# Patient Record
Sex: FEMALE | Race: WHITE | NOT HISPANIC OR LATINO
[De-identification: names, ages, dates, MRNs, and addresses within clinical notes are randomized per-mention and may not be internally consistent; named-entity substitution may affect disease eponyms.]

---

## 2020-04-26 ENCOUNTER — MESSAGE (OUTPATIENT)
Age: 25
End: 2020-04-26

## 2020-05-15 ENCOUNTER — APPOINTMENT (OUTPATIENT)
Dept: DISASTER EMERGENCY | Facility: CLINIC | Age: 25
End: 2020-05-15

## 2020-05-15 ENCOUNTER — TRANSCRIPTION ENCOUNTER (OUTPATIENT)
Age: 25
End: 2020-05-15

## 2020-05-16 LAB
SARS-COV-2 IGG SERPL IA-ACNC: <0.1 INDEX
SARS-COV-2 IGG SERPL QL IA: NEGATIVE

## 2020-07-20 ENCOUNTER — OUTPATIENT (OUTPATIENT)
Dept: OUTPATIENT SERVICES | Facility: HOSPITAL | Age: 25
LOS: 1 days | End: 2020-07-20
Payer: COMMERCIAL

## 2020-07-20 ENCOUNTER — APPOINTMENT (OUTPATIENT)
Dept: RADIOLOGY | Facility: CLINIC | Age: 25
End: 2020-07-20
Payer: SELF-PAY

## 2020-07-20 DIAGNOSIS — Z00.8 ENCOUNTER FOR OTHER GENERAL EXAMINATION: ICD-10-CM

## 2020-07-20 DIAGNOSIS — S99.921A UNSPECIFIED INJURY OF RIGHT FOOT, INITIAL ENCOUNTER: ICD-10-CM

## 2020-07-20 PROCEDURE — 73620 X-RAY EXAM OF FOOT: CPT | Mod: 26,RT

## 2020-07-20 PROCEDURE — 73620 X-RAY EXAM OF FOOT: CPT

## 2020-09-28 ENCOUNTER — TRANSCRIPTION ENCOUNTER (OUTPATIENT)
Age: 25
End: 2020-09-28

## 2021-01-19 ENCOUNTER — NON-APPOINTMENT (OUTPATIENT)
Age: 26
End: 2021-01-19

## 2021-01-19 ENCOUNTER — APPOINTMENT (OUTPATIENT)
Dept: OPHTHALMOLOGY | Facility: CLINIC | Age: 26
End: 2021-01-19
Payer: COMMERCIAL

## 2021-01-19 PROCEDURE — 92004 COMPRE OPH EXAM NEW PT 1/>: CPT

## 2021-01-19 PROCEDURE — 99072 ADDL SUPL MATRL&STAF TM PHE: CPT

## 2021-01-19 PROCEDURE — 92285 EXTERNAL OCULAR PHOTOGRAPHY: CPT

## 2021-01-22 ENCOUNTER — NON-APPOINTMENT (OUTPATIENT)
Age: 26
End: 2021-01-22

## 2021-01-22 ENCOUNTER — APPOINTMENT (OUTPATIENT)
Dept: OPHTHALMOLOGY | Facility: CLINIC | Age: 26
End: 2021-01-22
Payer: COMMERCIAL

## 2021-01-22 PROCEDURE — 99072 ADDL SUPL MATRL&STAF TM PHE: CPT

## 2021-01-22 PROCEDURE — 92012 INTRM OPH EXAM EST PATIENT: CPT

## 2021-01-25 ENCOUNTER — APPOINTMENT (OUTPATIENT)
Dept: OBGYN | Facility: CLINIC | Age: 26
End: 2021-01-25
Payer: COMMERCIAL

## 2021-01-25 VITALS
DIASTOLIC BLOOD PRESSURE: 60 MMHG | SYSTOLIC BLOOD PRESSURE: 100 MMHG | HEIGHT: 65 IN | WEIGHT: 138 LBS | BODY MASS INDEX: 22.99 KG/M2

## 2021-01-25 DIAGNOSIS — Z01.419 ENCOUNTER FOR GYNECOLOGICAL EXAMINATION (GENERAL) (ROUTINE) W/OUT ABNORMAL FINDINGS: ICD-10-CM

## 2021-01-25 PROCEDURE — 99385 PREV VISIT NEW AGE 18-39: CPT

## 2021-01-25 PROCEDURE — 99072 ADDL SUPL MATRL&STAF TM PHE: CPT

## 2021-01-26 LAB
C TRACH RRNA SPEC QL NAA+PROBE: NOT DETECTED
N GONORRHOEA RRNA SPEC QL NAA+PROBE: NOT DETECTED
SOURCE TP AMPLIFICATION: NORMAL

## 2021-01-28 ENCOUNTER — APPOINTMENT (OUTPATIENT)
Dept: OPHTHALMOLOGY | Facility: CLINIC | Age: 26
End: 2021-01-28
Payer: COMMERCIAL

## 2021-01-28 ENCOUNTER — NON-APPOINTMENT (OUTPATIENT)
Age: 26
End: 2021-01-28

## 2021-01-28 PROCEDURE — 99072 ADDL SUPL MATRL&STAF TM PHE: CPT

## 2021-01-28 PROCEDURE — 92012 INTRM OPH EXAM EST PATIENT: CPT

## 2021-01-30 PROBLEM — Z01.419 ENCOUNTER FOR ANNUAL ROUTINE GYNECOLOGICAL EXAMINATION: Status: RESOLVED | Noted: 2021-01-25 | Resolved: 2021-02-08

## 2021-03-25 ENCOUNTER — APPOINTMENT (OUTPATIENT)
Dept: INTERNAL MEDICINE | Facility: CLINIC | Age: 26
End: 2021-03-25
Payer: COMMERCIAL

## 2021-03-25 VITALS
BODY MASS INDEX: 24.59 KG/M2 | TEMPERATURE: 97 F | OXYGEN SATURATION: 99 % | RESPIRATION RATE: 16 BRPM | WEIGHT: 144 LBS | DIASTOLIC BLOOD PRESSURE: 70 MMHG | SYSTOLIC BLOOD PRESSURE: 110 MMHG | HEART RATE: 68 BPM | HEIGHT: 64 IN

## 2021-03-25 DIAGNOSIS — B02.30 ZOSTER OCULAR DISEASE, UNSPECIFIED: ICD-10-CM

## 2021-03-25 DIAGNOSIS — B00.52 HERPESVIRAL KERATITIS: ICD-10-CM

## 2021-03-25 DIAGNOSIS — Z82.49 FAMILY HISTORY OF ISCHEMIC HEART DISEASE AND OTHER DISEASES OF THE CIRCULATORY SYSTEM: ICD-10-CM

## 2021-03-25 DIAGNOSIS — Z83.3 FAMILY HISTORY OF DIABETES MELLITUS: ICD-10-CM

## 2021-03-25 DIAGNOSIS — Z80.1 FAMILY HISTORY OF MALIGNANT NEOPLASM OF TRACHEA, BRONCHUS AND LUNG: ICD-10-CM

## 2021-03-25 DIAGNOSIS — Z83.42 FAMILY HISTORY OF FAMILIAL HYPERCHOLESTEROLEMIA: ICD-10-CM

## 2021-03-25 PROCEDURE — 99072 ADDL SUPL MATRL&STAF TM PHE: CPT

## 2021-03-25 PROCEDURE — 99385 PREV VISIT NEW AGE 18-39: CPT

## 2021-03-25 NOTE — HISTORY OF PRESENT ILLNESS
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 She was seen by ophthalmology and started on Valtrex 500 mg t.i.d. Her symptoms have since resolved.\par She does have a history of Raynaud especially in her feet. She states symptoms worsened in the summer with discoloration and numbness sensation.\par Patient denies any cp, sob,abdominal pain, nausea, vomiting, palpitations, fever, chills, constipation, diarrhea.\par

## 2021-03-25 NOTE — ASSESSMENT
[FreeTextEntry1] : 1.health maintenance: Discussed fasting blood work to get.\par Patient counseled regarding recommendations for vaccines, seat belt safety, diet and exercise and all preventative screening.\par \par 2.herpes keratitis of right eye: Status post Valtrex, now resolved.\par \par 3.migraine headaches: Continue Excedrin as needed, keep headache journal, will call back for worsening symptoms. Next\par \par 4.Raynaud's syndrome: Continue preventative measures, does not wish to be on CCP at this time.

## 2021-04-05 LAB
ALBUMIN SERPL ELPH-MCNC: 4.4 G/DL
ALP BLD-CCNC: 51 U/L
ALT SERPL-CCNC: 13 U/L
ANION GAP SERPL CALC-SCNC: 11 MMOL/L
AST SERPL-CCNC: 13 U/L
BASOPHILS # BLD AUTO: 0.05 K/UL
BASOPHILS NFR BLD AUTO: 1 %
BILIRUB SERPL-MCNC: 0.5 MG/DL
BUN SERPL-MCNC: 11 MG/DL
CALCIUM SERPL-MCNC: 9.4 MG/DL
CHLORIDE SERPL-SCNC: 104 MMOL/L
CHOLEST SERPL-MCNC: 196 MG/DL
CO2 SERPL-SCNC: 24 MMOL/L
CREAT SERPL-MCNC: 0.8 MG/DL
EOSINOPHIL # BLD AUTO: 0.29 K/UL
EOSINOPHIL NFR BLD AUTO: 5.7 %
ESTIMATED AVERAGE GLUCOSE: 103 MG/DL
GLUCOSE SERPL-MCNC: 84 MG/DL
HBA1C MFR BLD HPLC: 5.2 %
HCT VFR BLD CALC: 43.2 %
HDLC SERPL-MCNC: 82 MG/DL
HGB BLD-MCNC: 13.8 G/DL
IMM GRANULOCYTES NFR BLD AUTO: 0.2 %
LDLC SERPL CALC-MCNC: 100 MG/DL
LYMPHOCYTES # BLD AUTO: 2.46 K/UL
LYMPHOCYTES NFR BLD AUTO: 48.3 %
MAN DIFF?: NORMAL
MCHC RBC-ENTMCNC: 28.2 PG
MCHC RBC-ENTMCNC: 31.9 GM/DL
MCV RBC AUTO: 88.3 FL
MONOCYTES # BLD AUTO: 0.41 K/UL
MONOCYTES NFR BLD AUTO: 8.1 %
NEUTROPHILS # BLD AUTO: 1.87 K/UL
NEUTROPHILS NFR BLD AUTO: 36.7 %
NONHDLC SERPL-MCNC: 115 MG/DL
PLATELET # BLD AUTO: 183 K/UL
POTASSIUM SERPL-SCNC: 4 MMOL/L
PROT SERPL-MCNC: 7.5 G/DL
RBC # BLD: 4.89 M/UL
RBC # FLD: 13 %
SODIUM SERPL-SCNC: 140 MMOL/L
TRIGL SERPL-MCNC: 73 MG/DL
TSH SERPL-ACNC: 1.69 UIU/ML
WBC # FLD AUTO: 5.09 K/UL

## 2021-08-03 ENCOUNTER — APPOINTMENT (OUTPATIENT)
Dept: INTERNAL MEDICINE | Facility: CLINIC | Age: 26
End: 2021-08-03
Payer: COMMERCIAL

## 2021-08-03 ENCOUNTER — NON-APPOINTMENT (OUTPATIENT)
Age: 26
End: 2021-08-03

## 2021-08-03 VITALS
OXYGEN SATURATION: 97 % | HEIGHT: 64 IN | WEIGHT: 139 LBS | BODY MASS INDEX: 23.73 KG/M2 | SYSTOLIC BLOOD PRESSURE: 110 MMHG | DIASTOLIC BLOOD PRESSURE: 86 MMHG | TEMPERATURE: 98.1 F | HEART RATE: 60 BPM | RESPIRATION RATE: 16 BRPM

## 2021-08-03 DIAGNOSIS — T14.8XXA OTHER INJURY OF UNSPECIFIED BODY REGION, INITIAL ENCOUNTER: ICD-10-CM

## 2021-08-03 DIAGNOSIS — M79.661 PAIN IN RIGHT LOWER LEG: ICD-10-CM

## 2021-08-03 DIAGNOSIS — R42 DIZZINESS AND GIDDINESS: ICD-10-CM

## 2021-08-03 DIAGNOSIS — R26.89 OTHER ABNORMALITIES OF GAIT AND MOBILITY: ICD-10-CM

## 2021-08-03 PROCEDURE — 99214 OFFICE O/P EST MOD 30 MIN: CPT | Mod: 25

## 2021-08-03 PROCEDURE — 93000 ELECTROCARDIOGRAM COMPLETE: CPT

## 2021-08-03 PROCEDURE — 36415 COLL VENOUS BLD VENIPUNCTURE: CPT

## 2021-08-03 NOTE — PHYSICAL EXAM
[No Acute Distress] : no acute distress [Well-Appearing] : well-appearing [Normal Sclera/Conjunctiva] : normal sclera/conjunctiva [PERRL] : pupils equal round and reactive to light [EOMI] : extraocular movements intact [Normal Oropharynx] : the oropharynx was normal [No Lymphadenopathy] : no lymphadenopathy [Supple] : supple [Thyroid Normal, No Nodules] : the thyroid was normal and there were no nodules present [No Respiratory Distress] : no respiratory distress  [Clear to Auscultation] : lungs were clear to auscultation bilaterally [Normal Rate] : normal rate  [Regular Rhythm] : with a regular rhythm [Normal S1, S2] : normal S1 and S2 [No Murmur] : no murmur heard [Pedal Pulses Present] : the pedal pulses are present [No Edema] : there was no peripheral edema [Soft] : abdomen soft [Non Tender] : non-tender [No HSM] : no HSM [Normal Supraclavicular Nodes] : no supraclavicular lymphadenopathy [Normal Posterior Cervical Nodes] : no posterior cervical lymphadenopathy [Normal Anterior Cervical Nodes] : no anterior cervical lymphadenopathy [No CVA Tenderness] : no CVA  tenderness [No Spinal Tenderness] : no spinal tenderness [No Joint Swelling] : no joint swelling [No Focal Deficits] : no focal deficits [Normal Gait] : normal gait [Deep Tendon Reflexes (DTR)] : deep tendon reflexes were 2+ and symmetric [Normal Affect] : the affect was normal [Alert and Oriented x3] : oriented to person, place, and time [de-identified] : No photophobia [de-identified] : No chest tenderness [de-identified] : Right calf: Faint bruising posteriorly, minimal diffuse tenderness; no palpable cord, negative Homans' sign [de-identified] : Negative Romberg's

## 2021-08-03 NOTE — HISTORY OF PRESENT ILLNESS
[FreeTextEntry8] : \par 24 yo F pmhx migraine headaches, Raynaud's for acute visit\par \par Last seen by PMD, Dr. Parson 3/25/21 for CPE with labs done.\par \par \par States had onset of lightheadedness few days ago, felt like would pass out - onset while sitting in car was passenger- had been driving x 5mins.  Notes same sx's as gets when has migraine HA.\par -had min usual HA (posterior throbbing), became clammy in hands, mild lightheadedness feeling- opened window and it resolved after 10 mins.  No LOC, vision trouble, dysarthria, focal weakness, incontinence or confusion.  Had last eaten 4 hrs prior.  Otherwise was feeling well.\par -denies hx hypoglycemic sx's if misses meals \par -denies recent illness\par -no sx recurrence since, notes hx similar x4 in past 2 mo.  Notes onset x 2 yrs, getting more frequent in past year, gets 1x/wk.  No known triggers. \par -denies hx neurology eval or brain imaging\par \par Usual HA a/w nausea, lightheaded- usually takes Excedrin and goes to sleep.  Recent sx's same, but was no abl to sleep and let it pass.\par hx imbalance on/off x 4-5 yrs, no clear relation to HA sx's, no falls\par \par c/o chest pain- either right sided or under left breast on/off x 2mo, no relation to activity\par -feels sharp, lasts 5 mins and self resolves\par -not a/w dizziness, palpitations or sob.  No relation to mood- denies anxiety.\par \par c/o easy bruising- noticed bruise on right calf 3d ago, noted on awakening due to pain felt.  Denies known trauma.\par -mainly tender on touching area, getting less.  No leg swelling or claudication noted.\par \par States noted some upper leg (right) bruising also recently- noted by mom/BF, denies known trauma, since resolved\par -occasionally gets gum bleeding, told has gingivitis- plans to f/u with dentist soon\par -denies any other clinical bleeding or FH abnl bleeding or blood clotting\par -on Nuvaring x 5 yrs\par -on Excedrin prn- last taken 4d prior to bruising onset.\par -No NSAIDs used.  No recent ETOH intake.\par \par Reports is socially distancing and using precautions for covid prevention.\par Denies sick or covid positive contacts.\par Denies fever, chills, cough or sob.\par -hx pfizer- last dose 2/21\par

## 2021-08-03 NOTE — REVIEW OF SYSTEMS
[Negative] : Psychiatric [FreeTextEntry5] : see HPI [de-identified] : see HPI [de-identified] : see HPI

## 2021-08-03 NOTE — ASSESSMENT
[FreeTextEntry1] : \par \par 24 yo F pmhx migraine headaches, Raynaud's for acute visit\par \par Migraine headaches, hx imbalance- stable migraine sx's reported, but increasing frequency; nonfocal neuro exam\par -Check MRI brain\par -Neuro referral for evaluation\par -Check labs: CBC/CMP/TSH/B12/folate\par -Advised prompt medical eval if symptoms recur or worsen or new symptoms arise\par -advised to keep HA diary to ID triggers\par \par Atypical chest pain, lightheadedness -vital signs stable, asx currently\par -EKG sinus bradycardia at 57, normal axis, no LVH/Path Q/ST changes (no prior)\par -Cardio referral for eval\par -Advised increased hydration, eat regularly with snacks\par -Check labs: CBC/CMP/TSH/B12/folate\par -Advised prompt medical eval if symptoms recur or worsen or new symptoms arise\par \par Bruising-no known history of trauma, on Excedrin as needed which may be contributory\par -Check CBC/PT/INR\par -Check RLE duplex\par -Consider heme eval if labs abnormal\par -Advised prompt medical eval if symptoms recur or worsen or new symptoms arise\par \par \par MISC:  Continued social distancing and measure for covid19 prevention encouraged.  \par -hx pfizer- last dose 2/21\par \par \par HCM\par -hx CPE 3/21 with PMD, Dr. Parson\par \par \par Pt's cell: [phone]\par \par Advised f/u in 1mo with PMD for cont'd care.  Will relay above to PMD.\par \par \par Labs drawn in office today.\par

## 2021-08-04 ENCOUNTER — NON-APPOINTMENT (OUTPATIENT)
Age: 26
End: 2021-08-04

## 2021-08-04 LAB
ANION GAP SERPL CALC-SCNC: 12 MMOL/L
BASOPHILS # BLD AUTO: 0.04 K/UL
BASOPHILS NFR BLD AUTO: 0.6 %
BUN SERPL-MCNC: 14 MG/DL
CALCIUM SERPL-MCNC: 9.4 MG/DL
CHLORIDE SERPL-SCNC: 106 MMOL/L
CO2 SERPL-SCNC: 23 MMOL/L
CREAT SERPL-MCNC: 0.68 MG/DL
EOSINOPHIL # BLD AUTO: 0.09 K/UL
EOSINOPHIL NFR BLD AUTO: 1.4 %
FOLATE SERPL-MCNC: 10.4 NG/ML
GLUCOSE SERPL-MCNC: 91 MG/DL
HCT VFR BLD CALC: 41.8 %
HGB BLD-MCNC: 13.3 G/DL
IMM GRANULOCYTES NFR BLD AUTO: 0.2 %
INR PPP: 0.94 RATIO
LYMPHOCYTES # BLD AUTO: 2.21 K/UL
LYMPHOCYTES NFR BLD AUTO: 35.6 %
MAN DIFF?: NORMAL
MCHC RBC-ENTMCNC: 27.7 PG
MCHC RBC-ENTMCNC: 31.8 GM/DL
MCV RBC AUTO: 87.1 FL
MONOCYTES # BLD AUTO: 0.52 K/UL
MONOCYTES NFR BLD AUTO: 8.4 %
NEUTROPHILS # BLD AUTO: 3.34 K/UL
NEUTROPHILS NFR BLD AUTO: 53.8 %
PLATELET # BLD AUTO: 216 K/UL
POTASSIUM SERPL-SCNC: 4.7 MMOL/L
PT BLD: 11.1 SEC
RBC # BLD: 4.8 M/UL
RBC # FLD: 13.1 %
SODIUM SERPL-SCNC: 142 MMOL/L
TSH SERPL-ACNC: 1.47 UIU/ML
VIT B12 SERPL-MCNC: 295 PG/ML
WBC # FLD AUTO: 6.21 K/UL

## 2021-08-20 ENCOUNTER — APPOINTMENT (OUTPATIENT)
Dept: OBGYN | Facility: CLINIC | Age: 26
End: 2021-08-20
Payer: COMMERCIAL

## 2021-08-20 VITALS
HEIGHT: 64 IN | WEIGHT: 138 LBS | DIASTOLIC BLOOD PRESSURE: 70 MMHG | BODY MASS INDEX: 23.56 KG/M2 | SYSTOLIC BLOOD PRESSURE: 112 MMHG

## 2021-08-20 PROCEDURE — 99212 OFFICE O/P EST SF 10 MIN: CPT

## 2021-09-07 ENCOUNTER — APPOINTMENT (OUTPATIENT)
Dept: INTERNAL MEDICINE | Facility: CLINIC | Age: 26
End: 2021-09-07

## 2021-09-30 ENCOUNTER — APPOINTMENT (OUTPATIENT)
Dept: CARDIOLOGY | Facility: CLINIC | Age: 26
End: 2021-09-30

## 2021-10-08 ENCOUNTER — NON-APPOINTMENT (OUTPATIENT)
Age: 26
End: 2021-10-08

## 2021-10-08 ENCOUNTER — APPOINTMENT (OUTPATIENT)
Dept: NEUROLOGY | Facility: CLINIC | Age: 26
End: 2021-10-08
Payer: COMMERCIAL

## 2021-10-08 VITALS
HEART RATE: 66 BPM | WEIGHT: 140 LBS | SYSTOLIC BLOOD PRESSURE: 125 MMHG | BODY MASS INDEX: 23.32 KG/M2 | HEIGHT: 65 IN | TEMPERATURE: 97.8 F | DIASTOLIC BLOOD PRESSURE: 84 MMHG

## 2021-10-08 PROCEDURE — 99205 OFFICE O/P NEW HI 60 MIN: CPT

## 2021-10-08 NOTE — HISTORY OF PRESENT ILLNESS
[FreeTextEntry1] : Ms. Noble is here today for neurology evaluation.\par She reports a history of migraines for ~ 2 years.\par Over the last 6-7 months she has been having migraines 1-2 times per week.\par \par Headaches are preceded by blurring of her vision and feeling clammy. Sometimes this is followed by feeling that her heart is racing.\par Headaches are usually focused over the temples and sometimes the base of the head/neck.\par She has associated nausea and phonophobia.\par Sometimes she has paresthesias in her hands.\par She is not aware of any specific triggers or correlation with her menstrual cycle.\par \par She typically takes Excedrin which usually helps.\par She denies family history of migraines.\par \par Over the last 2-3 months she feels forgetful.\par \par Sleep is sometimes fragmented but she does get adequate sleep duration and is not aware of snoring.\par \par

## 2021-10-08 NOTE — DISCUSSION/SUMMARY
[FreeTextEntry1] : Ms. Noble is a 26 year old woman who presents today for evaluation of headaches.\par Headaches are most consistent with migraines.\par There may also be some vasovagal component as she feels clammy and sometimes has palpitations.\par She has a normal neurological examination.\par \par Headaches:\par -Most likely migraines with possible aura.\par -MRI brain has been ordered by primary care physician and is pending.\par -We discussed both preventative and abortive treatment options. She would like to avoid daily medications  but since her headache frequency is somewhat high, I suggest a trial of magnesium 400-500 mg/day or Migrelief (magnesium + riboflavin + feverfew). I did advise her that magnesium may cause diarrhea.\par -Trial of rizatriptan when Excedrin is not effective. Can take 10 mg and repeat x 1 if needed with max of 20 mg/day. Potential side effects discussed. I am also giving her samples of Nurtec ODT 75 mg. She can \par take 1 as needed for a migraine.\par -Keep headache log.\par -Discussed common triggers.\par -Discussed that the combination of migraine with aura and estrogen containing OCPs increases risk for stroke. It is unclear if this risk is the same with Nuva ring. She does not smoke or have a history of blood clots. She can discuss this with gynecology and consider alternative methods of contraception such as an IUD.\par \par Memory concerns\par -MRI brain as above.\par -Vitamin B12 level on low side. Suggest taking vitamin B12 1000 mcg/day.\par \par f/u ~ 3 months, sooner if needed.

## 2021-12-02 ENCOUNTER — TRANSCRIPTION ENCOUNTER (OUTPATIENT)
Age: 26
End: 2021-12-02

## 2021-12-02 ENCOUNTER — APPOINTMENT (OUTPATIENT)
Dept: INTERNAL MEDICINE | Facility: CLINIC | Age: 26
End: 2021-12-02
Payer: COMMERCIAL

## 2021-12-02 DIAGNOSIS — J34.89 OTHER SPECIFIED DISORDERS OF NOSE AND NASAL SINUSES: ICD-10-CM

## 2021-12-02 DIAGNOSIS — R22.0 LOCALIZED SWELLING, MASS AND LUMP, HEAD: ICD-10-CM

## 2021-12-02 PROCEDURE — 99442: CPT

## 2021-12-02 NOTE — ASSESSMENT
[FreeTextEntry1] : 1.rhinorrhea/headache/congestion: pt with negative rapid covid 19 test on 11/30 with covid PCR swab from 12/1 still pending via S. Advised to quarantine until her PCR results return if she had exposure to covid 19 on 11/26 with her boyfriend. Discussed taking Tylenol for headache and fever. Discussed over-the-counter medications for congestion. Advised prompt evaluation at urgent care for her lip swelling.\par All questions answered.\par Discussed signs and symptoms to warrant urgent evaluation with patient.\par

## 2021-12-02 NOTE — HISTORY OF PRESENT ILLNESS
[Home] : at home, [unfilled] , at the time of the visit. [Medical Office: ([facility])___] : at the medical office located in  [Verbal consent obtained from patient] : the patient, [unfilled] [FreeTextEntry8] : Ms. KATIA OLIVEIRA is a 26 year F who calls in for an acute visit.\par Pt states she last saw her boyfriend on 11/25/21 on thanksgiving, and he started with symptoms of cough/congestion on Friday night 11/26 and he tested positive for covid on 11/30/21. She was with about 6 other people on thanksgiving with her boyfriend and they have no symptoms. \par Pt started with symptoms on Sunday with rhinorrhea with congestion on Monday, clear discharge. No fever/chills or anosmia. She does have mild headache as well on Sunday. She has mild chest tightness due to congestion with dry cough. \par She notes her mom has breast ca and didn't want to expose her and she had rapid covid test at [facility] Tues. night and was negative. \par She called [facility] and had PCR covid testing yest morning and has been out of work since then. She notes upper and lower lip swelling that  occurred this morning. She last took Advil cold/sinus yest and has no reactions in the past. She would like a medrol dose dawn. \par Patient denies any chest pain, shortness of breath, abdominal pain, nausea, vomiting, palpitations, constipation, diarrhea, loss of sense of taste/smell.\par

## 2021-12-07 LAB — SARS-COV-2 N GENE NPH QL NAA+PROBE: NOT DETECTED

## 2021-12-08 ENCOUNTER — MED ADMIN CHARGE (OUTPATIENT)
Age: 26
End: 2021-12-08

## 2021-12-09 ENCOUNTER — APPOINTMENT (OUTPATIENT)
Dept: INTERNAL MEDICINE | Facility: CLINIC | Age: 26
End: 2021-12-09
Payer: COMMERCIAL

## 2021-12-09 VITALS
TEMPERATURE: 97.8 F | DIASTOLIC BLOOD PRESSURE: 70 MMHG | HEIGHT: 65 IN | HEART RATE: 75 BPM | BODY MASS INDEX: 24.16 KG/M2 | SYSTOLIC BLOOD PRESSURE: 114 MMHG | WEIGHT: 145 LBS | OXYGEN SATURATION: 99 %

## 2021-12-09 DIAGNOSIS — J06.9 ACUTE UPPER RESPIRATORY INFECTION, UNSPECIFIED: ICD-10-CM

## 2021-12-09 PROCEDURE — 99213 OFFICE O/P EST LOW 20 MIN: CPT

## 2021-12-10 NOTE — HISTORY OF PRESENT ILLNESS
[FreeTextEntry8] : Ms. KATIA OLIVEIRA is a 26 year F who comes in for an acute visit.\par Pt started with cough/congestion since 11/28/21 and tested negative for covid x 2 PCR and 2 rapid tests. Her cough continues and she feels chest tightness as well.\par She has high anxiety due to her moms breast ca.\par Patient denies any cp, sob,abdominal pain, nausea, vomiting, palpitations, fever, chills, constipation, diarrhea.\par \par

## 2021-12-10 NOTE — ASSESSMENT
[FreeTextEntry1] : 1.URI: negative covid pcr x 2 rapid covid negative x 2. Start zpak and medrol dose dawn. Went over instructions and side effects of medication.\par Increase fluids, rest. \par Take Tylenol 500 mg 1-2 tabs as needed every 8 hours for pain. \par Call for new or worsening symptoms.\par

## 2021-12-28 ENCOUNTER — APPOINTMENT (OUTPATIENT)
Dept: INTERNAL MEDICINE | Facility: CLINIC | Age: 26
End: 2021-12-28
Payer: COMMERCIAL

## 2021-12-28 DIAGNOSIS — R05.9 COUGH, UNSPECIFIED: ICD-10-CM

## 2021-12-28 PROCEDURE — 99441: CPT

## 2021-12-28 NOTE — ASSESSMENT
[FreeTextEntry1] : 1.Cough: previously had negative covid tests (see previous note).\par obtain CXR to r/o infectious process. Start on Prednisone extended taper and codeine/guaifenesin prn for cough relief at bedtime. Went over instructions and side effects of medication.\par If no improvement will need to start on doxycycline as well. \par All questions answered.\par Call for new or worsening symptoms.\par

## 2021-12-28 NOTE — HISTORY OF PRESENT ILLNESS
[Home] : at home, [unfilled] , at the time of the visit. [Other Location: e.g. Home (Enter Location, City,State)___] : at [unfilled] [Verbal consent obtained from patient] : the patient, [unfilled] [FreeTextEntry8] : KATIA OLIVEIRA is a 26 year F who calls in for a follow up visit.\par Pt with recent URI/bronchitis and continues to have cough, dry with coughing fits. No Sob or chest pain. No fever/chills. Other symptoms have resolved. \par Patient denies any cp, sob,abdominal pain, nausea, vomiting, palpitations, constipation, diarrhea.\par

## 2021-12-31 ENCOUNTER — APPOINTMENT (OUTPATIENT)
Dept: MRI IMAGING | Facility: CLINIC | Age: 26
End: 2021-12-31
Payer: COMMERCIAL

## 2021-12-31 ENCOUNTER — RESULT REVIEW (OUTPATIENT)
Age: 26
End: 2021-12-31

## 2021-12-31 ENCOUNTER — TRANSCRIPTION ENCOUNTER (OUTPATIENT)
Age: 26
End: 2021-12-31

## 2021-12-31 ENCOUNTER — APPOINTMENT (OUTPATIENT)
Dept: RADIOLOGY | Facility: CLINIC | Age: 26
End: 2021-12-31

## 2021-12-31 ENCOUNTER — OUTPATIENT (OUTPATIENT)
Dept: OUTPATIENT SERVICES | Facility: HOSPITAL | Age: 26
LOS: 1 days | End: 2021-12-31
Payer: COMMERCIAL

## 2021-12-31 DIAGNOSIS — R05.9 COUGH, UNSPECIFIED: ICD-10-CM

## 2021-12-31 PROCEDURE — A9585: CPT

## 2021-12-31 PROCEDURE — 71046 X-RAY EXAM CHEST 2 VIEWS: CPT | Mod: 26

## 2021-12-31 PROCEDURE — 71046 X-RAY EXAM CHEST 2 VIEWS: CPT

## 2021-12-31 PROCEDURE — 70553 MRI BRAIN STEM W/O & W/DYE: CPT

## 2021-12-31 PROCEDURE — 70553 MRI BRAIN STEM W/O & W/DYE: CPT | Mod: 26

## 2022-01-10 ENCOUNTER — NON-APPOINTMENT (OUTPATIENT)
Age: 27
End: 2022-01-10

## 2022-01-11 ENCOUNTER — APPOINTMENT (OUTPATIENT)
Dept: NEUROLOGY | Facility: CLINIC | Age: 27
End: 2022-01-11
Payer: COMMERCIAL

## 2022-01-11 DIAGNOSIS — G43.909 MIGRAINE, UNSPECIFIED, NOT INTRACTABLE, W/OUT STATUS MIGRAINOSUS: ICD-10-CM

## 2022-01-11 PROCEDURE — 99213 OFFICE O/P EST LOW 20 MIN: CPT | Mod: 95

## 2022-01-11 NOTE — HISTORY OF PRESENT ILLNESS
[Other Location: e.g. School (Enter Location, City,State)___] : at [unfilled], at the time of the visit. [Verbal consent obtained from patient] : the patient, [unfilled] [FreeTextEntry1] : Initial visit 10/8/21:\par Ms. Noble is here today for neurology evaluation.\par She reports a history of migraines for ~ 2 years.\par Over the last 6-7 months she has been having migraines 1-2 times per week.\par \par Headaches are preceded by blurring of her vision and feeling clammy. Sometimes this is followed by feeling that her heart is racing.\par Headaches are usually focused over the temples and sometimes the base of the head/neck.\par She has associated nausea and phonophobia.\par Sometimes she has paresthesias in her hands.\par She is not aware of any specific triggers or correlation with her menstrual cycle.\par \par She typically takes Excedrin which usually helps.\par She denies family history of migraines.\par \par Over the last 2-3 months she feels forgetful.\par \par Sleep is sometimes fragmented but she does get adequate sleep duration and is not aware of snoring.\par \par Interval HIstory\par 1/11/22:\par She continues to have migraines. \par She has about one bad headache per week.\par Her last migraine lasted for three days and was not responsive to Excedrin, Aleve. \par She did not try magnesium or Migrelief. She states that she has been focusing on her mother's recent health problems.\par Stress seems to trigger migraines.\par She found that Nurtec was helpful in aborting migraines. \par She did not yet  the rizatriptan (pharmacy stated that they did not have the prescription).\par \par She has not started taking vitamin B12.\par \par She does not have plans of conceiving at the current time.

## 2022-01-11 NOTE — DATA REVIEWED
[de-identified] : MRI head with and without contrast 12/31/21:\par Unremarkable MRI of the brain with and without contrast. Mild paranasal sinus mucosal thickening

## 2022-01-11 NOTE — DISCUSSION/SUMMARY
[FreeTextEntry1] : Ms. Noble is a 26 year old woman who presents today for evaluation of headaches.\par Headaches are most consistent with migraines.\par There may also be some vasovagal component as she feels clammy and sometimes has palpitations.\par She has a normal neurological examination.\par \par Headaches:\par -Most likely migraines with possible aura.\par -MRI brain showed some sinus inflammation (she was treated with antibiotics and prednisone). but was otherwise unremarkable.\par -Headahce frequency is currently about once per week.\par -Trial of magnesium or migrelief to try to decrease frequency rather than starting a daily preventative medication.\par -Trial of rizatriptan MLT 10 mg to abort migraines. If not effective, will send prescription for Nurtec which she found to be effective (insurance usually requires failure of a triptan before approving Nurtec).\par \par Memory concerns\par -MRI brain unremarkable.\par -Vitamin B12 level on low side. Suggest taking vitamin B12 1000 mcg/day.\par \par f/u ~ 4-6 months, sooner if needed.

## 2022-01-13 ENCOUNTER — TRANSCRIPTION ENCOUNTER (OUTPATIENT)
Age: 27
End: 2022-01-13

## 2022-01-19 ENCOUNTER — EMERGENCY (EMERGENCY)
Facility: HOSPITAL | Age: 27
LOS: 0 days | Discharge: HOME | End: 2022-01-20
Attending: EMERGENCY MEDICINE
Payer: COMMERCIAL

## 2022-01-19 ENCOUNTER — APPOINTMENT (OUTPATIENT)
Dept: AFTER HOURS CARE | Facility: EMERGENCY ROOM | Age: 27
End: 2022-01-19
Payer: COMMERCIAL

## 2022-01-19 VITALS
DIASTOLIC BLOOD PRESSURE: 58 MMHG | HEIGHT: 63 IN | RESPIRATION RATE: 18 BRPM | OXYGEN SATURATION: 97 % | SYSTOLIC BLOOD PRESSURE: 118 MMHG | WEIGHT: 138.01 LBS | HEART RATE: 96 BPM | TEMPERATURE: 98 F

## 2022-01-19 DIAGNOSIS — R07.9 CHEST PAIN, UNSPECIFIED: ICD-10-CM

## 2022-01-19 PROCEDURE — 84484 ASSAY OF TROPONIN QUANT: CPT

## 2022-01-19 PROCEDURE — 84702 CHORIONIC GONADOTROPIN TEST: CPT

## 2022-01-19 PROCEDURE — 99285 EMERGENCY DEPT VISIT HI MDM: CPT

## 2022-01-19 PROCEDURE — 99283 EMERGENCY DEPT VISIT LOW MDM: CPT | Mod: 25

## 2022-01-19 PROCEDURE — 93005 ELECTROCARDIOGRAM TRACING: CPT

## 2022-01-19 PROCEDURE — 85025 COMPLETE CBC W/AUTO DIFF WBC: CPT

## 2022-01-19 PROCEDURE — 36415 COLL VENOUS BLD VENIPUNCTURE: CPT

## 2022-01-19 PROCEDURE — 80053 COMPREHEN METABOLIC PANEL: CPT

## 2022-01-19 PROCEDURE — 99202 OFFICE O/P NEW SF 15 MIN: CPT | Mod: NC,95

## 2022-01-19 PROCEDURE — 93010 ELECTROCARDIOGRAM REPORT: CPT

## 2022-01-19 NOTE — ED ADULT TRIAGE NOTE - CHIEF COMPLAINT QUOTE
sent by telehealth MD with complaints of chest pain all day after receiving third pfizer covid19 vaccine. chest pain worse when bending over.

## 2022-01-20 VITALS
HEART RATE: 87 BPM | DIASTOLIC BLOOD PRESSURE: 66 MMHG | TEMPERATURE: 98 F | SYSTOLIC BLOOD PRESSURE: 114 MMHG | OXYGEN SATURATION: 98 % | RESPIRATION RATE: 17 BRPM

## 2022-01-20 RX ORDER — KETOROLAC TROMETHAMINE 30 MG/ML
30 SYRINGE (ML) INJECTION ONCE
Refills: 0 | Status: DISCONTINUED | OUTPATIENT
Start: 2022-01-20 | End: 2022-01-20

## 2022-01-20 NOTE — ED PROVIDER NOTE - OBJECTIVE STATEMENT
Pt. is a 27 yo F on Nuvaring for birth control presenting with chest pain.  Pain is substernal and worse with movement, especially leaning forward.  Patient had Covid booster shot today and states chest pain started several hours after the shot. Patient states she had same symptoms with the other Covid shots.  She did a telemedicine consult prior to arrival and was sent to the ER for EKG.  Patient had fever prior to arrival but took tylenol.  Denies congestion, coughing, vomiting or diarrhea. Pt. is a 27 yo F on Nuvaring for birth control, hx of Raynauds syndrome is presenting with chest pain.  Pain is substernal and worse with movement, especially leaning forward.  Patient had Covid booster shot today and states chest pain started several hours after the shot. Patient states she had same symptoms with the other Covid shots.  She did a telemedicine consult prior to arrival and was sent to the ER for EKG.  Patient had fever prior to arrival but took tylenol.  Denies congestion, coughing, vomiting or diarrhea.

## 2022-01-20 NOTE — ED PROVIDER NOTE - CLINICAL SUMMARY MEDICAL DECISION MAKING FREE TEXT BOX
Possible vaccine side effect; but EKG labs normal.  NSAIDs or tylenol recommended with PMD follow up.

## 2022-01-20 NOTE — ED PROVIDER NOTE - PATIENT PORTAL LINK FT
You can access the FollowMyHealth Patient Portal offered by [facility] by registering at the following website: [email or web address]. By joining [employer]’s FollowMyHealth portal, you will also be able to view your health information using other applications (apps) compatible with our system.

## 2022-01-20 NOTE — ED ADULT NURSE NOTE - OBJECTIVE STATEMENT
pt presents to the ED c/o chest pain that started today worse with movement. Pt states she got her covid booster today and the same thing happened last time. She had a tele health call and they told her to go to the ER. Pt is awake and alert, following commands appropriately with equal unlabored respirations. CCM in place. Safety maintained.

## 2022-01-20 NOTE — ED PROVIDER NOTE - CARDIAC, MLM
Normal rate, regular rhythm.  Heart sounds S1, S2.  No murmurs, rubs or gallops. +reproducible tenderness along sternum

## 2022-01-20 NOTE — ED PROVIDER NOTE - PROGRESS NOTE DETAILS
Patient refused covid testing, Chest xray and toradol in ED.  Last CXR on 12/31/21 within normal limits.

## 2022-01-20 NOTE — ED PROVIDER NOTE - NSFOLLOWUPINSTRUCTIONS_ED_ALL_ED_FT
Take tylenol or ibuprofen for pain as needed.        Follow up with your doctor within 1 week.               CHEST WALL PAIN - AfterCare(R) Instructions(ER/ED)           Chest Wall Pain    WHAT YOU NEED TO KNOW:    Chest wall pain may be caused by problems with the muscles, cartilage, or bones of the chest wall. Chest wall pain may also be caused by pain that spreads to your chest from another part of your body. The pain may be aching, severe, dull, or sharp. It may come and go, or it may be constant. The pain may be worse when you move in certain ways, breathe deeply, or cough.     DISCHARGE INSTRUCTIONS:    Call 911 if:   •You have any of the following signs of a heart attack: ?Squeezing, pressure, or pain in your chest      ?You may also have any of the following: ?Discomfort or pain in your back, neck, jaw, stomach, or arm      ?Shortness of breath      ?Nausea or vomiting      ?Lightheadedness or a sudden cold sweat            Return to the emergency department if:   •You have severe pain.          Contact your healthcare provider if:   •You develop a rash.       •You have other new symptoms.      •Your pain does not improve, even with treatment.      •You have questions or concerns about your condition or care.       Medicines: You may need any of the following:   •NSAIDs, such as ibuprofen, help decrease swelling, pain, and fever. This medicine is available with or without a doctor's order. NSAIDs can cause stomach bleeding or kidney problems in certain people. If you take blood thinner medicine, always ask your healthcare provider if NSAIDs are safe for you. Always read the medicine label and follow directions.      •Acetaminophen decreases pain. It is available without a doctor's order. Ask how much to take and how often to take it. Follow directions. Acetaminophen can cause liver damage if not taken correctly.      •A cream may be applied to your chest to decrease pain.       •Take your medicine as directed. Contact your healthcare provider if you think your medicine is not helping or if you have side effects. Tell him of her if you are allergic to any medicine. Keep a list of the medicines, vitamins, and herbs you take. Include the amounts, and when and why you take them. Bring the list or the pill bottles to follow-up visits. Carry your medicine list with you in case of an emergency.      Follow up with your healthcare provider as directed: Write down your questions so you remember to ask them during your visits.     Self-care:   •Rest as needed. Avoid activities that make your chest wall pain worse.      •Apply heat on your chest for 20 to 30 minutes every 2 hours for as many days as directed. Heat helps decrease pain and muscle spasms.      •Apply ice on your chest for 15 to 20 minutes every hour or as directed. Use an ice pack, or put crushed ice in a plastic bag. Cover it with a towel. Ice helps prevent tissue damage and decreases swelling and pain.         © Copyright [employer] 2022           back to top                          © Copyright [employer] 2022

## 2022-02-02 NOTE — PHYSICAL EXAM
[de-identified] : [age]F\par General: pt appears stated age, and is not in distress, speaking in full clear sentences\par HEENT: AT/NC, pink conjunctiva, anicteric sclerae, EOMI, dry mm\par Neck: supple, full ROM, trachea midline\par Lungs: symmetric excursion, no respiratory distress, no tachypnea\par Extremities: no clubbing, cyanosis, or edema visualized and no obvious deformities or fractures\par Skin: no rashes, petechiae, ecchymoses, or jaundice\par Neuro: awake, alert, responsive; cranial nerves grossly intact, EOMI intact jaw movement, no facial asymmetry, hearing intact\par

## 2022-02-02 NOTE — PLAN
[By Private Vehicle] : Sent to Emergency Department by private vehicle [FreeTextEntry1] : 1)	Pt instructed to Take 2 tylenol 500mg now for her fever\par 2)	Pt advised to go to the nearest hospital for further management. She reports that someone can drive her and she doesn’t need EMS transport. [facility] is closest to her. \par

## 2022-02-02 NOTE — ASSESSMENT
[FreeTextEntry1] : Pt w/ aforementioned presentation concerning for but not limited to drug reaction, pericarditis, myocarditis, msk pain. Pt requires at the very least an EKG.

## 2022-02-02 NOTE — HISTORY OF PRESENT ILLNESS
[Home] : at home, [unfilled] , at the time of the visit. [Other Location: e.g. Home (Enter Location, City,State)___] : at [unfilled] [Verbal consent obtained from patient] : the patient, [unfilled] [FreeTextEntry8] : 25 Yo f hx of migraines and reynaud’s c/o CP since this morning. Pt reports receiving her Pfizer booster at 7:30am and immediately after developed numbness/paresthesias in her hand and heart racing. Since then she has had chest pain throughout the day described as substernal, non-radiating, pressure-like, worse w/ lying back or leaning forward and severe and constant since around 6pm. She also presently has a fever now w/ nausea and dizziness. She denies any vomiting, abd pain, dyspnea. She reports when she received her 2nd dose of vaccine she felt similarly wiped out w/ burning in her chest but this is different and much more severe and constant.

## 2022-02-23 ENCOUNTER — NON-APPOINTMENT (OUTPATIENT)
Age: 27
End: 2022-02-23

## 2022-03-15 ENCOUNTER — NON-APPOINTMENT (OUTPATIENT)
Age: 27
End: 2022-03-15

## 2022-03-17 ENCOUNTER — NON-APPOINTMENT (OUTPATIENT)
Age: 27
End: 2022-03-17

## 2022-03-21 ENCOUNTER — APPOINTMENT (OUTPATIENT)
Dept: OBGYN | Facility: CLINIC | Age: 27
End: 2022-03-21
Payer: COMMERCIAL

## 2022-03-21 VITALS — SYSTOLIC BLOOD PRESSURE: 114 MMHG | BODY MASS INDEX: 23.13 KG/M2 | WEIGHT: 139 LBS | DIASTOLIC BLOOD PRESSURE: 78 MMHG

## 2022-03-21 PROCEDURE — 99212 OFFICE O/P EST SF 10 MIN: CPT

## 2022-03-22 LAB
C TRACH RRNA SPEC QL NAA+PROBE: NOT DETECTED
N GONORRHOEA RRNA SPEC QL NAA+PROBE: NOT DETECTED
SOURCE AMPLIFICATION: NORMAL

## 2022-04-04 ENCOUNTER — NON-APPOINTMENT (OUTPATIENT)
Age: 27
End: 2022-04-04

## 2022-04-05 ENCOUNTER — APPOINTMENT (OUTPATIENT)
Dept: DERMATOLOGY | Facility: CLINIC | Age: 27
End: 2022-04-05
Payer: COMMERCIAL

## 2022-04-05 ENCOUNTER — NON-APPOINTMENT (OUTPATIENT)
Age: 27
End: 2022-04-05

## 2022-04-05 DIAGNOSIS — D18.01 HEMANGIOMA OF SKIN AND SUBCUTANEOUS TISSUE: ICD-10-CM

## 2022-04-05 DIAGNOSIS — D48.5 NEOPLASM OF UNCERTAIN BEHAVIOR OF SKIN: ICD-10-CM

## 2022-04-05 DIAGNOSIS — L81.4 OTHER MELANIN HYPERPIGMENTATION: ICD-10-CM

## 2022-04-05 DIAGNOSIS — D22.30 MELANOCYTIC NEVI OF UNSPECIFIED PART OF FACE: ICD-10-CM

## 2022-04-05 PROCEDURE — 11102 TANGNTL BX SKIN SINGLE LES: CPT

## 2022-04-05 PROCEDURE — 99203 OFFICE O/P NEW LOW 30 MIN: CPT | Mod: 25

## 2022-04-05 RX ORDER — PREDNISONE 20 MG/1
20 TABLET ORAL
Qty: 23 | Refills: 0 | Status: DISCONTINUED | COMMUNITY
Start: 2021-12-28 | End: 2022-04-05

## 2022-04-05 RX ORDER — RIZATRIPTAN BENZOATE 10 MG/1
10 TABLET, ORALLY DISINTEGRATING ORAL
Qty: 8 | Refills: 2 | Status: DISCONTINUED | COMMUNITY
Start: 2021-10-08 | End: 2022-04-05

## 2022-04-05 RX ORDER — METHYLPREDNISOLONE 4 MG/1
4 TABLET ORAL
Qty: 1 | Refills: 0 | Status: DISCONTINUED | COMMUNITY
Start: 2021-12-09 | End: 2022-04-05

## 2022-04-05 RX ORDER — DOXYCYCLINE HYCLATE 100 MG/1
100 TABLET ORAL
Qty: 14 | Refills: 0 | Status: DISCONTINUED | COMMUNITY
Start: 2021-12-28 | End: 2022-04-05

## 2022-04-05 RX ORDER — AZITHROMYCIN 250 MG/1
250 TABLET, FILM COATED ORAL
Qty: 1 | Refills: 0 | Status: DISCONTINUED | COMMUNITY
Start: 2021-12-09 | End: 2022-04-05

## 2022-04-05 RX ORDER — GUAIFENESIN AND CODEINE PHOSPHATE 10; 100 MG/5ML; MG/5ML
100-10 SOLUTION ORAL
Qty: 1 | Refills: 0 | Status: DISCONTINUED | COMMUNITY
Start: 2021-12-28 | End: 2022-04-05

## 2022-04-05 NOTE — HISTORY OF PRESENT ILLNESS
[FreeTextEntry1] : spot on nose [de-identified] : 26 year old with spot on left nasal side wall. bleeds. has been cauterized in past.

## 2022-04-05 NOTE — PHYSICAL EXAM
[FreeTextEntry3] : AAOx3, pleasant, NAD, no visual lymphadenopathy\par hair, scalp, face, nose, eyelids, ears, lips, oropharynx, neck, chest, abdomen, back, right arm, left arm, nails, and hands examined with all normal findings,\par pertinent findings include:\par \par left nasal side wall with red plaque

## 2022-04-05 NOTE — ASSESSMENT
[FreeTextEntry1] : 1) benign findings as above- education\par \par 2) Shave bx location left nasal side wall\par diagnosis: r/o angioma\par  \par Shave biopsy performed today over above location, risks and benefits discussed including incomplete removal, not enough tissue for diagnosis scarring and infection, informed consent obtained, pictures taken,  cleaned with alcohol and anesthetized with 1%lido+epi, 0.3 cc total, hemostasis obtained with cautery, vaseline and bandaid placed, tolerated well, wound care reviewed, specimen sent to pathology.\par \par discussed punch removal if continues to be bothersome

## 2022-04-25 ENCOUNTER — APPOINTMENT (OUTPATIENT)
Dept: OBGYN | Facility: CLINIC | Age: 27
End: 2022-04-25
Payer: COMMERCIAL

## 2022-04-25 VITALS
WEIGHT: 145 LBS | DIASTOLIC BLOOD PRESSURE: 64 MMHG | SYSTOLIC BLOOD PRESSURE: 110 MMHG | HEIGHT: 65 IN | BODY MASS INDEX: 24.16 KG/M2

## 2022-04-25 DIAGNOSIS — Z30.49 ENCOUNTER FOR SURVEILLANCE OF OTHER CONTRACEPTIVES: ICD-10-CM

## 2022-04-25 PROCEDURE — 99395 PREV VISIT EST AGE 18-39: CPT

## 2022-04-26 PROBLEM — Z30.49 ENCOUNTER FOR SURVEILLANCE OF OTHER CONTRACEPTIVE: Status: ACTIVE | Noted: 2021-08-20

## 2022-05-23 ENCOUNTER — APPOINTMENT (OUTPATIENT)
Dept: CARDIOLOGY | Facility: CLINIC | Age: 27
End: 2022-05-23
Payer: COMMERCIAL

## 2022-05-23 ENCOUNTER — NON-APPOINTMENT (OUTPATIENT)
Age: 27
End: 2022-05-23

## 2022-05-23 VITALS
OXYGEN SATURATION: 98 % | SYSTOLIC BLOOD PRESSURE: 128 MMHG | BODY MASS INDEX: 23.32 KG/M2 | DIASTOLIC BLOOD PRESSURE: 87 MMHG | WEIGHT: 140 LBS | HEIGHT: 65 IN | HEART RATE: 76 BPM

## 2022-05-23 VITALS — SYSTOLIC BLOOD PRESSURE: 112 MMHG | DIASTOLIC BLOOD PRESSURE: 74 MMHG

## 2022-05-23 DIAGNOSIS — R07.89 OTHER CHEST PAIN: ICD-10-CM

## 2022-05-23 PROCEDURE — 93000 ELECTROCARDIOGRAM COMPLETE: CPT

## 2022-05-23 PROCEDURE — 99203 OFFICE O/P NEW LOW 30 MIN: CPT

## 2022-05-23 NOTE — ASSESSMENT
[FreeTextEntry1] : Patient with above hx \par \par atypical recurrent chest pain  possible musculoskeletal origin ,   doubt pericarditis  recommend to take motrion 400 mg po tid  with food , blood work CBC ESR D dimer , CRP level ,  echocardiogram to assess ventricular function and exercise stress test \par \par

## 2022-05-23 NOTE — REVIEW OF SYSTEMS
[Negative] : Heme/Lymph [Palpitations] : no palpitations [Orthopnea] : no orthopnea [Syncope] : no syncope [FreeTextEntry5] : as per HPI

## 2022-05-23 NOTE — HISTORY OF PRESENT ILLNESS
[FreeTextEntry1] : 26 year old female hx of migraine came with complain of having chest pain over night , started in the middle  of night , sharp , migrating  deep inside pain ,increase movement ,   migrating towards left , not associated with shortness of breath , not related to exertion , \par \par Patient also complain she was having episodes of chest pains over the year , not related to exertion , no prior hx of DVT or PE , no family hx of PE or DVT , denies any fever or chills \par \par \par her blood work showed normal TC  TG  HDL   \par

## 2022-07-19 ENCOUNTER — TRANSCRIPTION ENCOUNTER (OUTPATIENT)
Age: 27
End: 2022-07-19

## 2022-08-01 ENCOUNTER — NON-APPOINTMENT (OUTPATIENT)
Age: 27
End: 2022-08-01

## 2022-08-01 ENCOUNTER — TRANSCRIPTION ENCOUNTER (OUTPATIENT)
Age: 27
End: 2022-08-01

## 2022-08-16 ENCOUNTER — TRANSCRIPTION ENCOUNTER (OUTPATIENT)
Age: 27
End: 2022-08-16

## 2022-08-16 ENCOUNTER — NON-APPOINTMENT (OUTPATIENT)
Age: 27
End: 2022-08-16

## 2022-08-24 ENCOUNTER — APPOINTMENT (OUTPATIENT)
Dept: GASTROENTEROLOGY | Facility: CLINIC | Age: 27
End: 2022-08-24

## 2022-08-24 VITALS
HEIGHT: 65 IN | BODY MASS INDEX: 23.32 KG/M2 | HEART RATE: 53 BPM | SYSTOLIC BLOOD PRESSURE: 133 MMHG | WEIGHT: 140 LBS | DIASTOLIC BLOOD PRESSURE: 90 MMHG

## 2022-08-24 DIAGNOSIS — R10.9 UNSPECIFIED ABDOMINAL PAIN: ICD-10-CM

## 2022-08-24 DIAGNOSIS — K21.9 GASTRO-ESOPHAGEAL REFLUX DISEASE W/OUT ESOPHAGITIS: ICD-10-CM

## 2022-08-24 DIAGNOSIS — R14.0 ABDOMINAL DISTENSION (GASEOUS): ICD-10-CM

## 2022-08-24 PROCEDURE — 99204 OFFICE O/P NEW MOD 45 MIN: CPT

## 2022-08-24 NOTE — ASSESSMENT
[FreeTextEntry1] : Plan:\par Counseled pt at length of importance dietary and lifestyle modifications such as avoiding dairy and gluten if they seem to be trigger foods. Offered celiac testing to see if patient has gluten intolerance or gluten sensitivity. For GERD symptoms recommend famotidine 20MG BID. If symptoms are not improved with medication and dietary modifications, will consider EGD and Colonoscopy. Pt agrees to plan, discussed with Dr. Gary.

## 2022-08-24 NOTE — REVIEW OF SYSTEMS
[Abdominal Pain] : abdominal pain [Vomiting] : no vomiting [Constipation] : no constipation [Diarrhea] : diarrhea [Heartburn] : heartburn [Melena] : no melena [Negative] : Heme/Lymph

## 2022-08-24 NOTE — HISTORY OF PRESENT ILLNESS
[de-identified] : Eneida Noble is a 26 year old female presenting today for initial evaluation. Pt reports for many months has noticed her stomach has been very sensitive. States as soon as she eats, she needs to have a BM, they are often soft, pt experiences lower abdominal cramping and bloating associated. Notes these symptoms are particularly worse when she consumes dairy or gluten. Denies blood in stool, or unintentional weight loss. Also reports that she has epigastric discomfort after eating more than three times weekly. Does report some intermittent dysphagia, but states when she attempts to clear her throat it improves.

## 2022-08-24 NOTE — PHYSICAL EXAM
[General Appearance - Alert] : alert [General Appearance - In No Acute Distress] : in no acute distress [Sclera] : the sclera and conjunctiva were normal [Outer Ear] : the ears and nose were normal in appearance [] : no respiratory distress [Respiration, Rhythm And Depth] : normal respiratory rhythm and effort [Heart Rate And Rhythm] : heart rate was normal and rhythm regular [Bowel Sounds] : normal bowel sounds [Abdomen Soft] : soft [Abdomen Tenderness] : non-tender [Abnormal Walk] : normal gait [Skin Color & Pigmentation] : normal skin color and pigmentation [Oriented To Time, Place, And Person] : oriented to person, place, and time

## 2022-09-01 ENCOUNTER — TRANSCRIPTION ENCOUNTER (OUTPATIENT)
Age: 27
End: 2022-09-01

## 2022-09-15 ENCOUNTER — TRANSCRIPTION ENCOUNTER (OUTPATIENT)
Age: 27
End: 2022-09-15

## 2022-09-27 ENCOUNTER — TRANSCRIPTION ENCOUNTER (OUTPATIENT)
Age: 27
End: 2022-09-27

## 2022-10-03 ENCOUNTER — TRANSCRIPTION ENCOUNTER (OUTPATIENT)
Age: 27
End: 2022-10-03

## 2022-10-04 ENCOUNTER — TRANSCRIPTION ENCOUNTER (OUTPATIENT)
Age: 27
End: 2022-10-04

## 2022-10-06 ENCOUNTER — TRANSCRIPTION ENCOUNTER (OUTPATIENT)
Age: 27
End: 2022-10-06

## 2022-10-20 ENCOUNTER — TRANSCRIPTION ENCOUNTER (OUTPATIENT)
Age: 27
End: 2022-10-20

## 2022-10-27 ENCOUNTER — TRANSCRIPTION ENCOUNTER (OUTPATIENT)
Age: 27
End: 2022-10-27

## 2022-10-28 ENCOUNTER — TRANSCRIPTION ENCOUNTER (OUTPATIENT)
Age: 27
End: 2022-10-28

## 2022-11-09 ENCOUNTER — TRANSCRIPTION ENCOUNTER (OUTPATIENT)
Age: 27
End: 2022-11-09

## 2022-11-21 ENCOUNTER — APPOINTMENT (OUTPATIENT)
Dept: OBGYN | Facility: CLINIC | Age: 27
End: 2022-11-21

## 2022-11-21 VITALS
WEIGHT: 143 LBS | HEIGHT: 65 IN | BODY MASS INDEX: 23.82 KG/M2 | DIASTOLIC BLOOD PRESSURE: 70 MMHG | SYSTOLIC BLOOD PRESSURE: 116 MMHG

## 2022-11-21 DIAGNOSIS — Z30.44 ENCOUNTER FOR SURVEILLANCE OF VAGINAL RING HORMONAL CONTRACEPTIVE DEVICE: ICD-10-CM

## 2022-11-21 PROCEDURE — 99213 OFFICE O/P EST LOW 20 MIN: CPT

## 2023-03-23 ENCOUNTER — APPOINTMENT (OUTPATIENT)
Dept: INTERNAL MEDICINE | Facility: CLINIC | Age: 28
End: 2023-03-23

## 2023-03-28 ENCOUNTER — APPOINTMENT (OUTPATIENT)
Dept: INTERNAL MEDICINE | Facility: CLINIC | Age: 28
End: 2023-03-28
Payer: COMMERCIAL

## 2023-03-28 ENCOUNTER — NON-APPOINTMENT (OUTPATIENT)
Age: 28
End: 2023-03-28

## 2023-03-28 VITALS
OXYGEN SATURATION: 97 % | WEIGHT: 149 LBS | HEIGHT: 65 IN | BODY MASS INDEX: 24.83 KG/M2 | DIASTOLIC BLOOD PRESSURE: 82 MMHG | SYSTOLIC BLOOD PRESSURE: 139 MMHG | HEART RATE: 66 BPM

## 2023-03-28 VITALS — DIASTOLIC BLOOD PRESSURE: 68 MMHG | SYSTOLIC BLOOD PRESSURE: 116 MMHG

## 2023-03-28 DIAGNOSIS — R00.2 PALPITATIONS: ICD-10-CM

## 2023-03-28 DIAGNOSIS — Z80.42 FAMILY HISTORY OF MALIGNANT NEOPLASM OF PROSTATE: ICD-10-CM

## 2023-03-28 DIAGNOSIS — F41.1 GENERALIZED ANXIETY DISORDER: ICD-10-CM

## 2023-03-28 DIAGNOSIS — Z84.1 FAMILY HISTORY OF DISORDERS OF KIDNEY AND URETER: ICD-10-CM

## 2023-03-28 DIAGNOSIS — L70.9 ACNE, UNSPECIFIED: ICD-10-CM

## 2023-03-28 DIAGNOSIS — E53.8 DEFICIENCY OF OTHER SPECIFIED B GROUP VITAMINS: ICD-10-CM

## 2023-03-28 PROCEDURE — 99395 PREV VISIT EST AGE 18-39: CPT | Mod: 25

## 2023-03-28 PROCEDURE — G0444 DEPRESSION SCREEN ANNUAL: CPT | Mod: 59

## 2023-03-28 PROCEDURE — 99213 OFFICE O/P EST LOW 20 MIN: CPT | Mod: 25

## 2023-03-28 PROCEDURE — 93000 ELECTROCARDIOGRAM COMPLETE: CPT | Mod: 59

## 2023-03-28 RX ORDER — ACETAMINOPHEN, ASPIRIN, AND CAFFEINE 250; 250; 65 MG/1; MG/1; MG/1
250-250-65 TABLET, FILM COATED ORAL
Refills: 0 | Status: DISCONTINUED | COMMUNITY
End: 2023-03-28

## 2023-03-28 RX ORDER — FAMOTIDINE 20 MG/1
20 TABLET, FILM COATED ORAL
Qty: 180 | Refills: 3 | Status: DISCONTINUED | COMMUNITY
Start: 2022-08-24 | End: 2023-03-28

## 2023-03-28 NOTE — PHYSICAL EXAM
[place] infant of 37 completed weeks of gestation [No Acute Distress] : no acute distress [Well Nourished] : well nourished [Well Developed] : well developed [Well-Appearing] : well-appearing [Normal Sclera/Conjunctiva] : normal sclera/conjunctiva [No Lymphadenopathy] : no lymphadenopathy [Thyroid Normal, No Nodules] : the thyroid was normal and there were no nodules present [No Respiratory Distress] : no respiratory distress  [No Accessory Muscle Use] : no accessory muscle use [Clear to Auscultation] : lungs were clear to auscultation bilaterally [Normal Rate] : normal rate  [Regular Rhythm] : with a regular rhythm [Normal S1, S2] : normal S1 and S2 [No Murmur] : no murmur heard [No Carotid Bruits] : no carotid bruits [No Abdominal Bruit] : a ~M bruit was not heard ~T in the abdomen [No Edema] : there was no peripheral edema [Normal Appearance] : normal in appearance [No Masses] : no palpable masses [No Axillary Lymphadenopathy] : no axillary lymphadenopathy [Soft] : abdomen soft [Non Tender] : non-tender [Non-distended] : non-distended [Normal Bowel Sounds] : normal bowel sounds [Normal Supraclavicular Nodes] : no supraclavicular lymphadenopathy [Normal Axillary Nodes] : no axillary lymphadenopathy [Normal Posterior Cervical Nodes] : no posterior cervical lymphadenopathy [Normal Anterior Cervical Nodes] : no anterior cervical lymphadenopathy [No CVA Tenderness] : no CVA  tenderness [No Spinal Tenderness] : no spinal tenderness [Normal Gait] : normal gait [Alert and Oriented x3] : oriented to person, place, and time

## 2023-03-28 NOTE — HISTORY OF PRESENT ILLNESS
[FreeTextEntry1] : CPE [de-identified] : Eneida today for CPE.  She has a history of migraine headaches, Raynaud's phenomenon, and anxiety.  Reports some increasing eyestrain lately which she attributes to poor sleep and increased chest.  Reports her eye exam was normal with normal vision.  Has also noticed some increased cystic-like acne around the chin and some increasing palpitations.  Sometimes, she reports that her heart feels like it is racing.  Reports negative cardiac work-up last year.  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

## 2023-03-28 NOTE — HEALTH RISK ASSESSMENT
[Yes] : Yes [0] : 2) Feeling down, depressed, or hopeless: Not at all (0) [PHQ-2 Negative - No further assessment needed] : PHQ-2 Negative - No further assessment needed [Patient reported PAP Smear was normal] : Patient reported PAP Smear was normal [Never] : Never [[id number]] : 0 [PapSmearComments] : Advise follow-up with GYN

## 2023-03-28 NOTE — ASSESSMENT
[FreeTextEntry1] : CPE\par -Check CBC, CMP, A1c, lipid, UA\par EKG is sinus bradycardia\par Up-to-date with Pap smear\par \par 1.  Borderline B12 level\par Recheck B12\par \par 2.  Palpitations\par EKG today sinus bradycardia.\par Check CBC for anemia, thyroid function test\par If unrevealing, may need cardiology reevaluation for possible Holter monitor\par \par 3.  Cystic acne\par Check testosterone/DHEA-S\par \par 4.  Anxiety\par Well-controlled at present.  Continue therapy.\par \par

## 2023-04-18 ENCOUNTER — TRANSCRIPTION ENCOUNTER (OUTPATIENT)
Age: 28
End: 2023-04-18

## 2023-04-27 LAB
ALBUMIN SERPL ELPH-MCNC: 4.2 G/DL
ALP BLD-CCNC: 56 U/L
ALT SERPL-CCNC: 11 U/L
ANION GAP SERPL CALC-SCNC: 14 MMOL/L
APPEARANCE: ABNORMAL
AST SERPL-CCNC: 12 U/L
BACTERIA: NEGATIVE /HPF
BASOPHILS # BLD AUTO: 0.05 K/UL
BASOPHILS NFR BLD AUTO: 0.9 %
BILIRUB SERPL-MCNC: 0.4 MG/DL
BILIRUBIN URINE: NEGATIVE
BLOOD URINE: NEGATIVE
BUN SERPL-MCNC: 12 MG/DL
CALCIUM SERPL-MCNC: 9.3 MG/DL
CAST: 1 /LPF
CHLORIDE SERPL-SCNC: 104 MMOL/L
CHOLEST SERPL-MCNC: 206 MG/DL
CO2 SERPL-SCNC: 22 MMOL/L
COLOR: YELLOW
CREAT SERPL-MCNC: 0.75 MG/DL
DHEA-SULFATE, SERUM: 42 UG/DL
EGFR: 112 ML/MIN/1.73M2
EOSINOPHIL # BLD AUTO: 0.1 K/UL
EOSINOPHIL NFR BLD AUTO: 1.7 %
EPITHELIAL CELLS: 1 /HPF
ESTIMATED AVERAGE GLUCOSE: 103 MG/DL
FOLATE SERPL-MCNC: 6.7 NG/ML
GLUCOSE QUALITATIVE U: NEGATIVE MG/DL
GLUCOSE SERPL-MCNC: 87 MG/DL
HBA1C MFR BLD HPLC: 5.2 %
HCT VFR BLD CALC: 43.1 %
HDLC SERPL-MCNC: 79 MG/DL
HGB BLD-MCNC: 13.5 G/DL
IMM GRANULOCYTES NFR BLD AUTO: 0.2 %
KETONES URINE: NEGATIVE MG/DL
LDLC SERPL CALC-MCNC: 107 MG/DL
LEUKOCYTE ESTERASE URINE: NEGATIVE
LYMPHOCYTES # BLD AUTO: 2.59 K/UL
LYMPHOCYTES NFR BLD AUTO: 45 %
MAN DIFF?: NORMAL
MCHC RBC-ENTMCNC: 27.6 PG
MCHC RBC-ENTMCNC: 31.3 GM/DL
MCV RBC AUTO: 88.1 FL
MICROSCOPIC-UA: NORMAL
MONOCYTES # BLD AUTO: 0.48 K/UL
MONOCYTES NFR BLD AUTO: 8.3 %
NEUTROPHILS # BLD AUTO: 2.53 K/UL
NEUTROPHILS NFR BLD AUTO: 43.9 %
NITRITE URINE: NEGATIVE
NONHDLC SERPL-MCNC: 127 MG/DL
PH URINE: 6
PLATELET # BLD AUTO: 221 K/UL
POTASSIUM SERPL-SCNC: 4.4 MMOL/L
PROT SERPL-MCNC: 7.4 G/DL
PROTEIN URINE: NORMAL MG/DL
RBC # BLD: 4.89 M/UL
RBC # FLD: 13 %
RED BLOOD CELLS URINE: 0 /HPF
SODIUM SERPL-SCNC: 140 MMOL/L
SPECIFIC GRAVITY URINE: 1.03
TESTOST SERPL-MCNC: 16.6 NG/DL
TRIGL SERPL-MCNC: 98 MG/DL
TSH SERPL-ACNC: 2.27 UIU/ML
UROBILINOGEN URINE: 0.2 MG/DL
VIT B12 SERPL-MCNC: 308 PG/ML
WBC # FLD AUTO: 5.76 K/UL
WHITE BLOOD CELLS URINE: 0 /HPF

## 2023-05-24 NOTE — ED ADULT NURSE NOTE - SUICIDE SCREENING QUESTION 1
Discharge Summary    Name: Diana Mcgill  [id number]  YOB: 1977 (Age: 55 y.o.)   Date of Admission: 5/22/2023  Date of Discharge: 5/24/2023  Attending Physician: Barby att. providers found    Discharge Diagnosis:     Acute/subacute CVA involving left frontal lobe  Diabetes mellitus  HTN  MDD/SCAR    Consultations:  IP CONSULT TO HOSPITALIST  IP CONSULT TO NEUROLOGY      Brief Admission History/Reason for Admission Per Ambrosio Aviles DO:   Diana Mcgill is a 55 y.o.  female with PMHx significant for CAD, essential hypertension, hyperlipidemia, diabetes mellitus, MDD/SCAR who presents with complaints of right arm numbness and heaviness with right-sided facial droop and dysarthria lasting approximately 1-2 minutes. Patient reports she has been having intermittent episodes of aforementioned symptoms over the past month without clear trigger. Tonight symptoms seemed more pronounced raising concern for CVA prompting presentation to emergency department for evaluation. Patient denies prior history of CVA. Patient reports at time of presentation mentation was foggy and she had mild headache which is now resolved. ROS otherwise negative. She denies tobacco and illicit drug use and reports rare alcohol consumption. In the ED, patient afebrile and hemodynamically stable (hypertensive 160s/90s), saturating upper 90s on room air. CT head negative for acute process, CTA head and neck notable for mild stenosis of left carotid bifurcation (50% stenosis), labs demonstrate: WBC 12.2, hemoglobin 14.7, platelets 129, sodium 137, potassium 4.3, glucose 131, BUN 23, creatinine 1.07. Patient given aspirin, Compazine, Toradol, Benadryl by ED provider. Brief Hospital Course by Main Problems:   Patient was admitted has management of acute/subacute CVA involving left frontal lobe. MRI was positive for CVA as above. Patient was continued on aspirin and Plavix.   Hemoglobin A1c 7.0 and No

## 2023-06-26 DIAGNOSIS — L50.9 URTICARIA, UNSPECIFIED: ICD-10-CM

## 2023-07-18 ENCOUNTER — APPOINTMENT (OUTPATIENT)
Dept: INTERNAL MEDICINE | Facility: CLINIC | Age: 28
End: 2023-07-18
Payer: COMMERCIAL

## 2023-07-18 VITALS
BODY MASS INDEX: 24.46 KG/M2 | WEIGHT: 147 LBS | DIASTOLIC BLOOD PRESSURE: 89 MMHG | OXYGEN SATURATION: 100 % | HEART RATE: 56 BPM | SYSTOLIC BLOOD PRESSURE: 131 MMHG

## 2023-07-18 DIAGNOSIS — G43.909 MIGRAINE, UNSPECIFIED, NOT INTRACTABLE, W/OUT STATUS MIGRAINOSUS: ICD-10-CM

## 2023-07-18 PROCEDURE — 36415 COLL VENOUS BLD VENIPUNCTURE: CPT

## 2023-07-18 PROCEDURE — 99214 OFFICE O/P EST MOD 30 MIN: CPT | Mod: 25

## 2023-07-19 NOTE — ASSESSMENT
[FreeTextEntry1] : 1. Migraines\par Increase in frequency\par Start B12- risk of B12 def discussed with patient\par Check CBC, CMP, TFTs again\par MRI brain to rule out changes\par Needs Neuro follow up-- she will schedule

## 2023-07-19 NOTE — HISTORY OF PRESENT ILLNESS
[FreeTextEntry8] : Eneida here today with concern about headache.  Reports history of chronic migraine.  Has been following with neurology in the past and was to be prescribed Nurtec, though there was a problem with insurance coverage.  After that, headaches had really improved and frequency had decreased medically.  However, they have been occurring more frequently again.  Most recently, she had a migraine that lasted 3 to 4 days.  Headache has resolved today.  She had nausea but no vomiting.  No paresthesias.  No clear aura.  Often feels foggy and exhausted during and after her migraine episode.  She also feels like her memory is not as sharp.  Did have vomiting with 1 migraine recently.  Does still have NuvaRing.  We discussed the risks of OCPs with migraines.  It was felt in the past that her migraines had improved and were so infrequent that the risk was minimal.  She will discuss with her OB/GYN and also has been advised to make an appointment with neurology.  She has not yet started B12.

## 2023-07-19 NOTE — REVIEW OF SYSTEMS
[Fever] : no fever [Chills] : no chills [Night Sweats] : no night sweats [Vision Problems] : no vision problems [Chest Pain] : no chest pain [Palpitations] : no palpitations [Shortness Of Breath] : no shortness of breath [Wheezing] : no wheezing [Cough] : no cough [Abdominal Pain] : no abdominal pain [Nausea] : no nausea [Constipation] : no constipation [Diarrhea] : diarrhea [Vomiting] : no vomiting [Headache] : headache

## 2023-07-19 NOTE — PHYSICAL EXAM
[No Acute Distress] : no acute distress [Well Nourished] : well nourished [Well Developed] : well developed [Well-Appearing] : well-appearing [Normal Sclera/Conjunctiva] : normal sclera/conjunctiva [Thyroid Normal, No Nodules] : the thyroid was normal and there were no nodules present [No Respiratory Distress] : no respiratory distress  [No Accessory Muscle Use] : no accessory muscle use [Clear to Auscultation] : lungs were clear to auscultation bilaterally [Normal Rate] : normal rate  [Regular Rhythm] : with a regular rhythm [Normal S1, S2] : normal S1 and S2 [No Murmur] : no murmur heard [No Carotid Bruits] : no carotid bruits [No Edema] : there was no peripheral edema [Normal Gait] : normal gait [Cranial Nerves Optic (II)] : visual acuity and visual fields were intact [Cranial Nerves Oculomotor (III)] : the extraocular motions were intact [Cranial Nerves Trigeminal (V)] : sensation to the face and masseter strength were intact [Cranial Nerves Facial (VII)] : facial strength was intact bilaterally [Cranial Nerves Vestibulocochlear (VIII)] : hearing was intact [Cranial Nerves Glossopharyngeal (IX)] : there was normal movement of the soft palate and normal gag [Cranial Nerves Accessory (XI - Cranial And Spinal)] : shoulder shrug was intact bilaterally [Cranial Nerves Hypoglossal (XII)] : there was no tongue deviation with protrusion [Sensation Tactile Decrease] : light touch was intact [Dysdiadochokinesia Bilaterally] : not present [Coordination - Dysmetria Impaired Finger-to-Nose Bilateral] : not present [Coordination - Dysmetria Impaired Heel-to-Shin Bilateral] : not present [Alert and Oriented x3] : oriented to person, place, and time

## 2023-07-24 ENCOUNTER — OUTPATIENT (OUTPATIENT)
Dept: OUTPATIENT SERVICES | Facility: HOSPITAL | Age: 28
LOS: 1 days | End: 2023-07-24
Payer: COMMERCIAL

## 2023-07-24 ENCOUNTER — APPOINTMENT (OUTPATIENT)
Dept: MRI IMAGING | Facility: CLINIC | Age: 28
End: 2023-07-24
Payer: COMMERCIAL

## 2023-07-24 DIAGNOSIS — Z00.8 ENCOUNTER FOR OTHER GENERAL EXAMINATION: ICD-10-CM

## 2023-07-24 DIAGNOSIS — G43.909 MIGRAINE, UNSPECIFIED, NOT INTRACTABLE, WITHOUT STATUS MIGRAINOSUS: ICD-10-CM

## 2023-07-24 PROCEDURE — A9585: CPT

## 2023-07-24 PROCEDURE — 70553 MRI BRAIN STEM W/O & W/DYE: CPT | Mod: 26

## 2023-07-24 PROCEDURE — 70553 MRI BRAIN STEM W/O & W/DYE: CPT

## 2023-07-31 ENCOUNTER — APPOINTMENT (OUTPATIENT)
Dept: OBGYN | Facility: CLINIC | Age: 28
End: 2023-07-31
Payer: COMMERCIAL

## 2023-07-31 VITALS
BODY MASS INDEX: 24.16 KG/M2 | WEIGHT: 145 LBS | SYSTOLIC BLOOD PRESSURE: 116 MMHG | HEIGHT: 65 IN | DIASTOLIC BLOOD PRESSURE: 74 MMHG

## 2023-07-31 DIAGNOSIS — Z01.419 ENCOUNTER FOR GYNECOLOGICAL EXAMINATION (GENERAL) (ROUTINE) W/OUT ABNORMAL FINDINGS: ICD-10-CM

## 2023-07-31 DIAGNOSIS — Z30.41 ENCOUNTER FOR SURVEILLANCE OF CONTRACEPTIVE PILLS: ICD-10-CM

## 2023-07-31 PROCEDURE — 99395 PREV VISIT EST AGE 18-39: CPT

## 2023-08-04 LAB — CYTOLOGY CVX/VAG DOC THIN PREP: NORMAL

## 2023-08-09 LAB
ALBUMIN SERPL ELPH-MCNC: 4.5 G/DL
ALP BLD-CCNC: 61 U/L
ALT SERPL-CCNC: 11 U/L
ANION GAP SERPL CALC-SCNC: 14 MMOL/L
AST SERPL-CCNC: 12 U/L
BILIRUB SERPL-MCNC: 0.3 MG/DL
BUN SERPL-MCNC: 16 MG/DL
CALCIUM SERPL-MCNC: 9.4 MG/DL
CHLORIDE SERPL-SCNC: 102 MMOL/L
CO2 SERPL-SCNC: 24 MMOL/L
CREAT SERPL-MCNC: 0.7 MG/DL
EGFR: 121 ML/MIN/1.73M2
ERYTHROCYTE [SEDIMENTATION RATE] IN BLOOD BY WESTERGREN METHOD: 11 MM/HR
FOLATE SERPL-MCNC: 7.6 NG/ML
GLUCOSE SERPL-MCNC: 84 MG/DL
POTASSIUM SERPL-SCNC: 3.9 MMOL/L
PROT SERPL-MCNC: 7.8 G/DL
SODIUM SERPL-SCNC: 139 MMOL/L
T4 FREE SERPL-MCNC: 1.4 NG/DL
TSH SERPL-ACNC: 1.26 UIU/ML
VIT B12 SERPL-MCNC: 283 PG/ML

## 2024-01-18 ENCOUNTER — APPOINTMENT (OUTPATIENT)
Dept: OBGYN | Facility: CLINIC | Age: 29
End: 2024-01-18
Payer: COMMERCIAL

## 2024-01-18 VITALS
BODY MASS INDEX: 23.82 KG/M2 | SYSTOLIC BLOOD PRESSURE: 110 MMHG | HEIGHT: 65 IN | DIASTOLIC BLOOD PRESSURE: 60 MMHG | WEIGHT: 143 LBS

## 2024-01-18 DIAGNOSIS — Z30.09 ENCOUNTER FOR OTHER GENERAL COUNSELING AND ADVICE ON CONTRACEPTION: ICD-10-CM

## 2024-01-18 PROCEDURE — 99214 OFFICE O/P EST MOD 30 MIN: CPT

## 2024-03-18 ENCOUNTER — NON-APPOINTMENT (OUTPATIENT)
Age: 29
End: 2024-03-18

## 2024-04-04 ENCOUNTER — NON-APPOINTMENT (OUTPATIENT)
Age: 29
End: 2024-04-04

## 2024-04-04 ENCOUNTER — LABORATORY RESULT (OUTPATIENT)
Age: 29
End: 2024-04-04

## 2024-04-04 ENCOUNTER — APPOINTMENT (OUTPATIENT)
Dept: INTERNAL MEDICINE | Facility: CLINIC | Age: 29
End: 2024-04-04
Payer: COMMERCIAL

## 2024-04-04 VITALS
HEIGHT: 64 IN | OXYGEN SATURATION: 98 % | SYSTOLIC BLOOD PRESSURE: 114 MMHG | WEIGHT: 163 LBS | HEART RATE: 57 BPM | DIASTOLIC BLOOD PRESSURE: 67 MMHG | BODY MASS INDEX: 27.83 KG/M2

## 2024-04-04 DIAGNOSIS — E53.8 DEFICIENCY OF OTHER SPECIFIED B GROUP VITAMINS: ICD-10-CM

## 2024-04-04 DIAGNOSIS — Z91.89 OTHER SPECIFIED PERSONAL RISK FACTORS, NOT ELSEWHERE CLASSIFIED: ICD-10-CM

## 2024-04-04 DIAGNOSIS — Z00.00 ENCOUNTER FOR GENERAL ADULT MEDICAL EXAMINATION W/OUT ABNORMAL FINDINGS: ICD-10-CM

## 2024-04-04 DIAGNOSIS — Z80.3 FAMILY HISTORY OF MALIGNANT NEOPLASM OF BREAST: ICD-10-CM

## 2024-04-04 DIAGNOSIS — R25.1 TREMOR, UNSPECIFIED: ICD-10-CM

## 2024-04-04 DIAGNOSIS — R51.9 HEADACHE, UNSPECIFIED: ICD-10-CM

## 2024-04-04 DIAGNOSIS — I73.00 RAYNAUD'S SYNDROME W/OUT GANGRENE: ICD-10-CM

## 2024-04-04 DIAGNOSIS — R20.2 PARESTHESIA OF SKIN: ICD-10-CM

## 2024-04-04 PROCEDURE — 36415 COLL VENOUS BLD VENIPUNCTURE: CPT

## 2024-04-04 PROCEDURE — 99395 PREV VISIT EST AGE 18-39: CPT

## 2024-04-04 PROCEDURE — 93000 ELECTROCARDIOGRAM COMPLETE: CPT

## 2024-04-04 PROCEDURE — 99214 OFFICE O/P EST MOD 30 MIN: CPT | Mod: 25

## 2024-04-04 RX ORDER — CYANOCOBALAMIN (VITAMIN B-12) 1000 MCG
1000 TABLET ORAL DAILY
Qty: 90 | Refills: 0 | Status: DISCONTINUED | COMMUNITY
Start: 2023-07-18 | End: 2024-04-04

## 2024-04-04 RX ORDER — ETONOGESTREL AND ETHINYL ESTRADIOL 11.7; 2.7 MG/1; MG/1
0.12-0.015 INSERT, EXTENDED RELEASE VAGINAL
Qty: 3 | Refills: 3 | Status: DISCONTINUED | COMMUNITY
Start: 2021-01-25 | End: 2024-04-04

## 2024-04-04 NOTE — HEALTH RISK ASSESSMENT
[Yes] : Yes [0] : 2) Feeling down, depressed, or hopeless: Not at all (0) [PHQ-2 Negative - No further assessment needed] : PHQ-2 Negative - No further assessment needed [[id number]] : 0 [Patient reported PAP Smear was normal] : Patient reported PAP Smear was normal [Never] : Never

## 2024-04-04 NOTE — REVIEW OF SYSTEMS
[Fever] : no fever [Chills] : no chills [Night Sweats] : no night sweats [Vision Problems] : no vision problems [Chest Pain] : no chest pain [Palpitations] : no palpitations [Shortness Of Breath] : no shortness of breath [Wheezing] : no wheezing [Cough] : no cough [Abdominal Pain] : no abdominal pain [Nausea] : no nausea [Constipation] : no constipation [Diarrhea] : diarrhea [Vomiting] : no vomiting [Dysuria] : no dysuria [Hematuria] : no hematuria [Joint Pain] : no joint pain [Muscle Pain] : no muscle pain [Headache] : no headache [Dizziness] : no dizziness

## 2024-04-04 NOTE — ASSESSMENT
[id number]  Treatment Note    Date: 9/3/2020  Patient: Mamadou Layton  : 1972  ACCT #: [de-identified]   Visit Information:  PT Visit Information  PT Insurance Information: [facility] [id number]  Total # of Visits Approved: 18  Total # of Visits to Date: 12  Plan of Care/Certification Expiration Date: 20  Subjective: Pt  reports pain the right shoulder has been manageable with pain medication   HEP Compliance:  [x] Good [] Fair [] Poor [] Reports not doing due to:  Vital Signs  Patient Currently in Pain: Yes   Pain Screening  Patient Currently in Pain: Yes  Pain Assessment  Pain Assessment: 0-10  Pain Level: 2  OBJECTIVE:   Exercises  Exercise 3: sidelying punches  2x10 reps  Exercise 4: side lying abduction 3# 2x10 reps  Exercise 6: forward wall slides with BUE's 10x  Exercise 7: red therapy ball holds with RUE elbow flexed at side moving arm towards scaption to pt's tolerance and back to midline alternating with bilateral overhead ball lift x 5  Exercise 8: Apollo chest presses x 20 lb x 10 reps  Exercise 9: Apollo triceps pull downs 30 lb x 10 reps  Exercise 11: supine with arm abducted to 90 degrees: ER/IR with 4 lbs  Exercise 17: body blade forward at 90 degrees flexion, scaption position and abduction 30 sec -45 seconds  each  Strength: [] NT  [x] MMT completed:   Strength RUE  Comment: right middle trap 5/5   ROM: [] NT  [] ROM measurements:   AROM RUE (degrees)  R Shoulder Flexion 0-180: 160  R Shoulder Extension 0-45: 45  R Shoulder ABduction 0-180: 138  R Shoulder Int Rotation  0-70: 64  R Shoulder Ext Rotation 0-90: 80   Assessment:    Assessment: All goals met except for scoring 70 or more on the Upper Extremity Functional Index  Prognosis: Good   Goals:  Short term goals  Short term goal 1: Pt will exhibit 130 active elevation of the right shoulder (met)  Short term goal 2: Pt will exhibit 5/5 right scapular strength (met)  Short term goal 3: Pain levels at the right shoulder will [FreeTextEntry1] : CPE CBC, CMP, A1c, lipid, UA EKG is sinus rhythm with respiratory variation Up-to-date with Pap High risk of breast cancer given her family history.  Mom was BRCA negative.  Recommend considering establishing with high risk breast clinic.  Referral recommendations provided  1.  Headaches Improved off of hormonal birth control.  However, now with increased Raynaud's symptoms and numbness/tingling and occasional tremor/shakiness Recheck B12  2.  Numbness/achiness Check labs as above plus HIV, syphilis, B12, inflammatory markers, SPEP with light chains, Lyme, vitamin D Recommend follow-up with neurology  3.  Raynaud's Labs as above plus inflammatory markers, SHERRY with double-stranded today, RF with CCP, ANCA, Sjogren antibodies  Follow-up pending above  decrease to 2/10 (met)  Short term goal 4: Pt will be independent with a HEP (met)  Long term goals  Long term goal 2: Pt will be able to perform RUE weightbearing push off from the floor to transition from prone to sit and to stand without pain levels going over 4/10 (met)  Long term goal 3: RUE disability score 53  Long term goal 4: Pt will complete lifting activities of 25 lbs or more without an increase in pain (met)  Progress toward goals:  POST-PAIN       Pain Rating (0-10 pain scale):  6/10   Location and pain description same as pre-treatment unless indicated. Action: [] NA   [x] Perform HEP  [] Meds as prescribed  [] Modalities as prescribed   [] Call Physician   Frequency/Duration:  Plan  Plan Comment: D/C PT services. Pt to start work conditioning program on 9/8/2020   Pt to continue current HEP. See objective section for any therapeutic exercise changes, additions or modifications this date.   PT Individual Minutes  Time In: 1000  Time Out: 1100  Minutes: 60  Timed Code Treatment Minutes: 60 Minutes  Procedure Minutes:     Modality Time In Time Out Total Minutes Units   Ther ex ([id number]) [id number]   Re-Eval ([id number]) [id number]       Signature:  Electronically signed by Adalgisa Muro PT on 9/3/20 at 11:04 AM EDT

## 2024-04-04 NOTE — PHYSICAL EXAM
[No Acute Distress] : no acute distress [Well Nourished] : well nourished [Well Developed] : well developed [Well-Appearing] : well-appearing [Normal Sclera/Conjunctiva] : normal sclera/conjunctiva [No Lymphadenopathy] : no lymphadenopathy [Thyroid Normal, No Nodules] : the thyroid was normal and there were no nodules present [No Respiratory Distress] : no respiratory distress  [No Accessory Muscle Use] : no accessory muscle use [Clear to Auscultation] : lungs were clear to auscultation bilaterally [Normal Rate] : normal rate  [Regular Rhythm] : with a regular rhythm [Normal S1, S2] : normal S1 and S2 [No Murmur] : no murmur heard [No Carotid Bruits] : no carotid bruits [No Edema] : there was no peripheral edema [Normal Appearance] : normal in appearance [No Masses] : no palpable masses [No Axillary Lymphadenopathy] : no axillary lymphadenopathy [Soft] : abdomen soft [Non Tender] : non-tender [Non-distended] : non-distended [Normal Bowel Sounds] : normal bowel sounds [Normal Supraclavicular Nodes] : no supraclavicular lymphadenopathy [Normal Axillary Nodes] : no axillary lymphadenopathy [Normal Posterior Cervical Nodes] : no posterior cervical lymphadenopathy [Normal Anterior Cervical Nodes] : no anterior cervical lymphadenopathy [No Spinal Tenderness] : no spinal tenderness [Normal Gait] : normal gait [Cranial Nerves Optic (II)] : visual acuity and visual fields were intact [Cranial Nerves Oculomotor (III)] : the extraocular motions were intact [Cranial Nerves Trigeminal (V)] : sensation to the face and masseter strength were intact [Cranial Nerves Facial (VII)] : facial strength was intact bilaterally [Cranial Nerves Vestibulocochlear (VIII)] : hearing was intact [Cranial Nerves Glossopharyngeal (IX)] : there was normal movement of the soft palate and normal gag [Cranial Nerves Accessory (XI - Cranial And Spinal)] : shoulder shrug was intact bilaterally [Cranial Nerves Hypoglossal (XII)] : there was no tongue deviation with protrusion [Sensation Tactile Decrease] : light touch was intact [Dysdiadochokinesia Bilaterally] : not present [Coordination - Dysmetria Impaired Finger-to-Nose Bilateral] : not present [Coordination - Dysmetria Impaired Heel-to-Shin Bilateral] : not present [Alert and Oriented x3] : oriented to person, place, and time

## 2024-04-23 ENCOUNTER — TRANSCRIPTION ENCOUNTER (OUTPATIENT)
Age: 29
End: 2024-04-23

## 2024-04-23 DIAGNOSIS — R76.8 OTHER SPECIFIED ABNORMAL IMMUNOLOGICAL FINDINGS IN SERUM: ICD-10-CM

## 2024-04-23 LAB
25(OH)D3 SERPL-MCNC: 27.6 NG/ML
ALBUMIN MFR SERPL ELPH: 56.5 %
ALBUMIN SERPL ELPH-MCNC: 4.8 G/DL
ALBUMIN SERPL-MCNC: 4.6 G/DL
ALBUMIN/GLOB SERPL: 1.3 RATIO
ALP BLD-CCNC: 90 U/L
ALPHA1 GLOB MFR SERPL ELPH: 3.5 %
ALPHA1 GLOB SERPL ELPH-MCNC: 0.3 G/DL
ALPHA2 GLOB MFR SERPL ELPH: 10.1 %
ALPHA2 GLOB SERPL ELPH-MCNC: 0.8 G/DL
ALT SERPL-CCNC: 25 U/L
ANA SER IF-ACNC: NEGATIVE
ANION GAP SERPL CALC-SCNC: 15 MMOL/L
APPEARANCE: CLEAR
AST SERPL-CCNC: 25 U/L
B-GLOBULIN MFR SERPL ELPH: 10.7 %
B-GLOBULIN SERPL ELPH-MCNC: 0.9 G/DL
BACTERIA: NEGATIVE /HPF
BASOPHILS # BLD AUTO: 0.05 K/UL
BASOPHILS NFR BLD AUTO: 0.5 %
BILIRUB SERPL-MCNC: 0.8 MG/DL
BILIRUBIN URINE: NEGATIVE
BLOOD URINE: NEGATIVE
BUN SERPL-MCNC: 10 MG/DL
CALCIUM SERPL-MCNC: 9.5 MG/DL
CAST: 0 /LPF
CCP AB SER IA-ACNC: <8 UNITS
CHLORIDE SERPL-SCNC: 99 MMOL/L
CHOLEST SERPL-MCNC: 189 MG/DL
CO2 SERPL-SCNC: 22 MMOL/L
COLOR: YELLOW
CREAT SERPL-MCNC: 0.67 MG/DL
CRP SERPL-MCNC: <3 MG/L
DEPRECATED KAPPA LC FREE/LAMBDA SER: 1.3 RATIO
DSDNA AB SER-ACNC: 1 IU/ML
EGFR: 122 ML/MIN/1.73M2
ENA SS-A AB SER IA-ACNC: <0.2 AL
ENA SS-B AB SER IA-ACNC: <0.2 AL
EOSINOPHIL # BLD AUTO: 0.1 K/UL
EOSINOPHIL NFR BLD AUTO: 1 %
EPITHELIAL CELLS: 3 /HPF
ERYTHROCYTE [SEDIMENTATION RATE] IN BLOOD BY WESTERGREN METHOD: 7 MM/HR
ESTIMATED AVERAGE GLUCOSE: 103 MG/DL
FOLATE SERPL-MCNC: 11.4 NG/ML
GAMMA GLOB FLD ELPH-MCNC: 1.6 G/DL
GAMMA GLOB MFR SERPL ELPH: 19.2 %
GLUCOSE QUALITATIVE U: NEGATIVE MG/DL
GLUCOSE SERPL-MCNC: 86 MG/DL
HBA1C MFR BLD HPLC: 5.2 %
HCT VFR BLD CALC: 45.5 %
HDLC SERPL-MCNC: 84 MG/DL
HGB BLD-MCNC: 14.6 G/DL
HIV1+2 AB SPEC QL IA.RAPID: NONREACTIVE
IMM GRANULOCYTES NFR BLD AUTO: 0.4 %
INTERPRETATION SERPL IEP-IMP: NORMAL
KAPPA LC CSF-MCNC: 2.43 MG/DL
KAPPA LC SERPL-MCNC: 3.17 MG/DL
KETONES URINE: NEGATIVE MG/DL
LDLC SERPL CALC-MCNC: 93 MG/DL
LEUKOCYTE ESTERASE URINE: ABNORMAL
LYMPHOCYTES # BLD AUTO: 2.57 K/UL
LYMPHOCYTES NFR BLD AUTO: 25.3 %
MAN DIFF?: NORMAL
MCHC RBC-ENTMCNC: 27.9 PG
MCHC RBC-ENTMCNC: 32.1 GM/DL
MCV RBC AUTO: 87 FL
MICROSCOPIC-UA: NORMAL
MONOCYTES # BLD AUTO: 0.7 K/UL
MONOCYTES NFR BLD AUTO: 6.9 %
NEUTROPHILS # BLD AUTO: 6.69 K/UL
NEUTROPHILS NFR BLD AUTO: 65.9 %
NITRITE URINE: NEGATIVE
NONHDLC SERPL-MCNC: 105 MG/DL
PH URINE: 6.5
PLATELET # BLD AUTO: 254 K/UL
POTASSIUM SERPL-SCNC: 4.2 MMOL/L
PROT SERPL-MCNC: 8.1 G/DL
PROTEIN URINE: NEGATIVE MG/DL
RBC # BLD: 5.23 M/UL
RBC # FLD: 13.6 %
RED BLOOD CELLS URINE: 1 /HPF
RF+CCP IGG SER-IMP: NEGATIVE
RHEUMATOID FACT SER QL: <10 IU/ML
SODIUM SERPL-SCNC: 135 MMOL/L
SPECIFIC GRAVITY URINE: 1.01
T PALLIDUM AB SER QL IA: NEGATIVE
T4 FREE SERPL-MCNC: 1.2 NG/DL
THYROGLOB AB SERPL-ACNC: <20 IU/ML
THYROPEROXIDASE AB SERPL IA-ACNC: <10 IU/ML
TRIGL SERPL-MCNC: 58 MG/DL
TSH SERPL-ACNC: 1.64 UIU/ML
UROBILINOGEN URINE: 0.2 MG/DL
VIT B12 SERPL-MCNC: 791 PG/ML
WBC # FLD AUTO: 10.15 K/UL
WHITE BLOOD CELLS URINE: 0 /HPF

## 2024-04-27 ENCOUNTER — TRANSCRIPTION ENCOUNTER (OUTPATIENT)
Age: 29
End: 2024-04-27

## 2024-04-27 LAB
ALBUMIN MFR SERPL ELPH: 57.3 %
ALBUMIN SERPL-MCNC: 4.5 G/DL
ALBUMIN/GLOB SERPL: 1.3 RATIO
ALBUPE: 16.8 %
ALPHA1 GLOB MFR SERPL ELPH: 3.4 %
ALPHA1 GLOB SERPL ELPH-MCNC: 0.3 G/DL
ALPHA1UPE: 34 %
ALPHA2 GLOB MFR SERPL ELPH: 9.5 %
ALPHA2 GLOB SERPL ELPH-MCNC: 0.8 G/DL
ALPHA2UPE: 18.8 %
B-GLOBULIN MFR SERPL ELPH: 10.6 %
B-GLOBULIN SERPL ELPH-MCNC: 0.8 G/DL
BASOPHILS # BLD AUTO: 0.05 K/UL
BASOPHILS NFR BLD AUTO: 0.6 %
BETAUPE: 20.3 %
DEPRECATED KAPPA LC FREE/LAMBDA SER: 1.39 RATIO
EOSINOPHIL # BLD AUTO: 0.1 K/UL
EOSINOPHIL NFR BLD AUTO: 1.2 %
FREE KAPPA URINE: 63.27 MG/L
FREE KAPPA/LAMDA RATIO: 8.55 RATIO
FREE LAMDA URINE: 7.4 MG/L
GAMMA GLOB FLD ELPH-MCNC: 1.5 G/DL
GAMMA GLOB MFR SERPL ELPH: 19.2 %
GAMMAUPE: 10.1 %
HCT VFR BLD CALC: 44.5 %
HGB BLD-MCNC: 13.9 G/DL
IGA 24H UR QL IFE: NORMAL
IMM GRANULOCYTES NFR BLD AUTO: 0.2 %
INTERPRETATION SERPL IEP-IMP: NORMAL
KAPPA LC 24H UR QL: NORMAL
KAPPA LC CSF-MCNC: 2.05 MG/DL
KAPPA LC SERPL-MCNC: 2.85 MG/DL
LYMPHOCYTES # BLD AUTO: 2.72 K/UL
LYMPHOCYTES NFR BLD AUTO: 34 %
M PROTEIN SPEC IFE-MCNC: NORMAL
MAN DIFF?: NORMAL
MCHC RBC-ENTMCNC: 27.9 PG
MCHC RBC-ENTMCNC: 31.2 GM/DL
MCV RBC AUTO: 89.2 FL
MONOCYTES # BLD AUTO: 0.61 K/UL
MONOCYTES NFR BLD AUTO: 7.6 %
NEUTROPHILS # BLD AUTO: 4.51 K/UL
NEUTROPHILS NFR BLD AUTO: 56.4 %
PLATELET # BLD AUTO: 217 K/UL
PROT PATTERN 24H UR ELPH-IMP: NORMAL
PROT SERPL-MCNC: 7.9 G/DL
PROT SERPL-MCNC: 7.9 G/DL
PROT UR-MCNC: 13 MG/DL
PROT UR-MCNC: 13 MG/DL
RBC # BLD: 4.99 M/UL
RBC # FLD: 13.3 %
WBC # FLD AUTO: 8.01 K/UL

## 2024-06-17 ENCOUNTER — LABORATORY RESULT (OUTPATIENT)
Age: 29
End: 2024-06-17

## 2024-06-17 DIAGNOSIS — R30.0 DYSURIA: ICD-10-CM

## 2024-06-17 RX ORDER — NITROFURANTOIN (MONOHYDRATE/MACROCRYSTALS) 25; 75 MG/1; MG/1
100 CAPSULE ORAL
Qty: 14 | Refills: 0 | Status: ACTIVE | COMMUNITY
Start: 2024-06-17 | End: 1900-01-01

## 2024-06-18 LAB
APPEARANCE: ABNORMAL
BILIRUBIN URINE: NEGATIVE
BLOOD URINE: NEGATIVE
COLOR: YELLOW
GLUCOSE QUALITATIVE U: 100 MG/DL
KETONES URINE: NEGATIVE MG/DL
LEUKOCYTE ESTERASE URINE: ABNORMAL
NITRITE URINE: NEGATIVE
PH URINE: >=9
PROTEIN URINE: 100 MG/DL
SPECIFIC GRAVITY URINE: 1.03
UROBILINOGEN URINE: 1 MG/DL

## 2024-06-24 RX ORDER — SULFAMETHOXAZOLE AND TRIMETHOPRIM 800; 160 MG/1; MG/1
800-160 TABLET ORAL
Qty: 6 | Refills: 0 | Status: ACTIVE | COMMUNITY
Start: 2024-06-24 | End: 1900-01-01

## 2024-08-12 ENCOUNTER — APPOINTMENT (OUTPATIENT)
Dept: OBGYN | Facility: CLINIC | Age: 29
End: 2024-08-12
Payer: COMMERCIAL

## 2024-08-12 VITALS
BODY MASS INDEX: 28.68 KG/M2 | SYSTOLIC BLOOD PRESSURE: 118 MMHG | HEIGHT: 64 IN | DIASTOLIC BLOOD PRESSURE: 78 MMHG | WEIGHT: 168 LBS

## 2024-08-12 DIAGNOSIS — Z01.419 ENCOUNTER FOR GYNECOLOGICAL EXAMINATION (GENERAL) (ROUTINE) W/OUT ABNORMAL FINDINGS: ICD-10-CM

## 2024-08-12 PROCEDURE — 99395 PREV VISIT EST AGE 18-39: CPT

## 2024-09-26 ENCOUNTER — NON-APPOINTMENT (OUTPATIENT)
Age: 29
End: 2024-09-26

## 2024-10-08 ENCOUNTER — APPOINTMENT (OUTPATIENT)
Dept: DERMATOLOGY | Facility: CLINIC | Age: 29
End: 2024-10-08
Payer: COMMERCIAL

## 2024-10-08 DIAGNOSIS — R21 RASH AND OTHER NONSPECIFIC SKIN ERUPTION: ICD-10-CM

## 2024-10-08 DIAGNOSIS — L70.9 ACNE, UNSPECIFIED: ICD-10-CM

## 2024-10-08 PROCEDURE — 99214 OFFICE O/P EST MOD 30 MIN: CPT

## 2024-10-08 RX ORDER — SPIRONOLACTONE 100 MG/1
100 TABLET ORAL DAILY
Qty: 90 | Refills: 1 | Status: ACTIVE | COMMUNITY
Start: 2024-10-08 | End: 1900-01-01

## 2024-10-08 RX ORDER — TRETINOIN 0.25 MG/G
0.03 CREAM TOPICAL
Qty: 1 | Refills: 2 | Status: ACTIVE | COMMUNITY
Start: 2024-10-08 | End: 1900-01-01

## 2024-12-27 ENCOUNTER — NON-APPOINTMENT (OUTPATIENT)
Age: 29
End: 2024-12-27

## 2024-12-27 ENCOUNTER — EMERGENCY (EMERGENCY)
Facility: HOSPITAL | Age: 29
LOS: 0 days | Discharge: HOME | End: 2024-12-27
Attending: EMERGENCY MEDICINE
Payer: COMMERCIAL

## 2024-12-27 VITALS
DIASTOLIC BLOOD PRESSURE: 51 MMHG | TEMPERATURE: 98 F | SYSTOLIC BLOOD PRESSURE: 105 MMHG | OXYGEN SATURATION: 100 % | RESPIRATION RATE: 17 BRPM | HEART RATE: 83 BPM

## 2024-12-27 VITALS — WEIGHT: 170.2 LBS

## 2024-12-27 DIAGNOSIS — R55 SYNCOPE AND COLLAPSE: ICD-10-CM

## 2024-12-27 DIAGNOSIS — R11.2 NAUSEA WITH VOMITING, UNSPECIFIED: ICD-10-CM

## 2024-12-27 DIAGNOSIS — I49.8 OTHER SPECIFIED CARDIAC ARRHYTHMIAS: ICD-10-CM

## 2024-12-27 DIAGNOSIS — Z86.69 PERSONAL HISTORY OF OTHER DISEASES OF THE NERVOUS SYSTEM AND SENSE ORGANS: ICD-10-CM

## 2024-12-27 LAB
ALBUMIN SERPL ELPH-MCNC: 3.7 G/DL — SIGNIFICANT CHANGE UP (ref 3.3–5)
ALP SERPL-CCNC: 68 U/L — SIGNIFICANT CHANGE UP (ref 40–120)
ALT FLD-CCNC: 16 U/L — SIGNIFICANT CHANGE UP (ref 12–78)
ANION GAP SERPL CALC-SCNC: 7 MMOL/L — SIGNIFICANT CHANGE UP (ref 5–17)
AST SERPL-CCNC: 14 U/L — LOW (ref 15–37)
BASOPHILS # BLD AUTO: 0.01 K/UL — SIGNIFICANT CHANGE UP (ref 0–0.2)
BASOPHILS NFR BLD AUTO: 0.1 % — SIGNIFICANT CHANGE UP (ref 0–2)
BILIRUB SERPL-MCNC: 1 MG/DL — SIGNIFICANT CHANGE UP (ref 0.2–1.2)
BUN SERPL-MCNC: 12 MG/DL — SIGNIFICANT CHANGE UP (ref 7–23)
CALCIUM SERPL-MCNC: 8.6 MG/DL — SIGNIFICANT CHANGE UP (ref 8.5–10.1)
CHLORIDE SERPL-SCNC: 103 MMOL/L — SIGNIFICANT CHANGE UP (ref 96–108)
CO2 SERPL-SCNC: 25 MMOL/L — SIGNIFICANT CHANGE UP (ref 22–31)
CREAT SERPL-MCNC: 0.75 MG/DL — SIGNIFICANT CHANGE UP (ref 0.5–1.3)
EGFR: 110 ML/MIN/1.73M2 — SIGNIFICANT CHANGE UP
EOSINOPHIL # BLD AUTO: 0.01 K/UL — SIGNIFICANT CHANGE UP (ref 0–0.5)
EOSINOPHIL NFR BLD AUTO: 0.1 % — SIGNIFICANT CHANGE UP (ref 0–6)
GLUCOSE SERPL-MCNC: 108 MG/DL — HIGH (ref 70–99)
HCG SERPL-ACNC: <1 MIU/ML — SIGNIFICANT CHANGE UP
HCT VFR BLD CALC: 44.3 % — SIGNIFICANT CHANGE UP (ref 34.5–45)
HGB BLD-MCNC: 14.6 G/DL — SIGNIFICANT CHANGE UP (ref 11.5–15.5)
IMM GRANULOCYTES NFR BLD AUTO: 0.3 % — SIGNIFICANT CHANGE UP (ref 0–0.9)
LYMPHOCYTES # BLD AUTO: 0.41 K/UL — LOW (ref 1–3.3)
LYMPHOCYTES # BLD AUTO: 5.7 % — LOW (ref 13–44)
MANUAL SMEAR VERIFICATION: SIGNIFICANT CHANGE UP
MCHC RBC-ENTMCNC: 28.6 PG — SIGNIFICANT CHANGE UP (ref 27–34)
MCHC RBC-ENTMCNC: 33 G/DL — SIGNIFICANT CHANGE UP (ref 32–36)
MCV RBC AUTO: 86.7 FL — SIGNIFICANT CHANGE UP (ref 80–100)
MONOCYTES # BLD AUTO: 0.48 K/UL — SIGNIFICANT CHANGE UP (ref 0–0.9)
MONOCYTES NFR BLD AUTO: 6.6 % — SIGNIFICANT CHANGE UP (ref 2–14)
NEUTROPHILS # BLD AUTO: 6.3 K/UL — SIGNIFICANT CHANGE UP (ref 1.8–7.4)
NEUTROPHILS NFR BLD AUTO: 87.2 % — HIGH (ref 43–77)
PLAT MORPH BLD: NORMAL — SIGNIFICANT CHANGE UP
PLATELET # BLD AUTO: 178 K/UL — SIGNIFICANT CHANGE UP (ref 150–400)
POTASSIUM SERPL-MCNC: 4.3 MMOL/L — SIGNIFICANT CHANGE UP (ref 3.5–5.3)
POTASSIUM SERPL-SCNC: 4.3 MMOL/L — SIGNIFICANT CHANGE UP (ref 3.5–5.3)
PROT SERPL-MCNC: 8 GM/DL — SIGNIFICANT CHANGE UP (ref 6–8.3)
RBC # BLD: 5.11 M/UL — SIGNIFICANT CHANGE UP (ref 3.8–5.2)
RBC # FLD: 12.9 % — SIGNIFICANT CHANGE UP (ref 10.3–14.5)
RBC BLD AUTO: NORMAL — SIGNIFICANT CHANGE UP
SODIUM SERPL-SCNC: 135 MMOL/L — SIGNIFICANT CHANGE UP (ref 135–145)
TROPONIN I, HIGH SENSITIVITY RESULT: <3 NG/L — SIGNIFICANT CHANGE UP
WBC # BLD: 7.23 K/UL — SIGNIFICANT CHANGE UP (ref 3.8–10.5)
WBC # FLD AUTO: 7.23 K/UL — SIGNIFICANT CHANGE UP (ref 3.8–10.5)

## 2024-12-27 PROCEDURE — 80053 COMPREHEN METABOLIC PANEL: CPT

## 2024-12-27 PROCEDURE — 84484 ASSAY OF TROPONIN QUANT: CPT

## 2024-12-27 PROCEDURE — 96375 TX/PRO/DX INJ NEW DRUG ADDON: CPT

## 2024-12-27 PROCEDURE — 93005 ELECTROCARDIOGRAM TRACING: CPT

## 2024-12-27 PROCEDURE — 93010 ELECTROCARDIOGRAM REPORT: CPT

## 2024-12-27 PROCEDURE — 99285 EMERGENCY DEPT VISIT HI MDM: CPT

## 2024-12-27 PROCEDURE — 85025 COMPLETE CBC W/AUTO DIFF WBC: CPT

## 2024-12-27 PROCEDURE — 96374 THER/PROPH/DIAG INJ IV PUSH: CPT

## 2024-12-27 PROCEDURE — 84702 CHORIONIC GONADOTROPIN TEST: CPT

## 2024-12-27 PROCEDURE — 36415 COLL VENOUS BLD VENIPUNCTURE: CPT

## 2024-12-27 PROCEDURE — 99284 EMERGENCY DEPT VISIT MOD MDM: CPT | Mod: 25

## 2024-12-27 RX ORDER — KETOROLAC TROMETHAMINE 30 MG/ML
30 INJECTION INTRAMUSCULAR; INTRAVENOUS ONCE
Refills: 0 | Status: DISCONTINUED | OUTPATIENT
Start: 2024-12-27 | End: 2024-12-27

## 2024-12-27 RX ORDER — ONDANSETRON HYDROCHLORIDE 4 MG/1
4 TABLET, FILM COATED ORAL ONCE
Refills: 0 | Status: COMPLETED | OUTPATIENT
Start: 2024-12-27 | End: 2024-12-27

## 2024-12-27 RX ORDER — SODIUM CHLORIDE 9 MG/ML
1000 INJECTION, SOLUTION INTRAMUSCULAR; INTRAVENOUS; SUBCUTANEOUS ONCE
Refills: 0 | Status: COMPLETED | OUTPATIENT
Start: 2024-12-27 | End: 2024-12-27

## 2024-12-27 RX ORDER — ONDANSETRON HYDROCHLORIDE 4 MG/1
1 TABLET, FILM COATED ORAL
Qty: 1 | Refills: 0
Start: 2024-12-27 | End: 2024-12-31

## 2024-12-27 RX ADMIN — SODIUM CHLORIDE 1000 MILLILITER(S): 9 INJECTION, SOLUTION INTRAMUSCULAR; INTRAVENOUS; SUBCUTANEOUS at 16:13

## 2024-12-27 RX ADMIN — ONDANSETRON HYDROCHLORIDE 4 MILLIGRAM(S): 4 TABLET, FILM COATED ORAL at 17:26

## 2024-12-27 RX ADMIN — KETOROLAC TROMETHAMINE 30 MILLIGRAM(S): 30 INJECTION INTRAMUSCULAR; INTRAVENOUS at 17:26

## 2024-12-27 NOTE — ED STATDOCS - OBJECTIVE STATEMENT
29-year-old female history of migraines in the past presents to the emergency department for syncope.  Patient states that last night she woke up at 3:30 in the morning because she had some abdominal cramping was sitting on the toilet when she felt like her vision was closing in and she fainted and landed in the bathtub.  Patient got up and went back to sleep and then later in the day was still feeling crampy and nauseous with splashing water on her face and had another episode where she had a syncopal episode.  She went to urgent care and was told to come to the hospital for evaluation.  No chest pain no shortness of breath no leg swelling she had a very mild headache no neck pain.  Did not take anything for symptoms.  Now patient's main complaint is some acid reflux feeling cramping abdominal feeling and nausea.

## 2024-12-27 NOTE — ED STATDOCS - PATIENT PORTAL LINK FT
You can access the FollowMyHealth Patient Portal offered by [facility] by registering at the following website: [email or web address]. By joining [facility]’s FollowMyHealth portal, you will also be able to view your health information using other applications (apps) compatible with our system.

## 2024-12-27 NOTE — ED STATDOCS - ATTENDING APP SHARED VISIT CONTRIBUTION OF CARE
I, Emil Stockton MD, personally saw the patient with ACP.  I have personally performed a face to face diagnostic evaluation on this patient.   The initial assessment was performed by myself and then the patient was handed off to the ACP. The patient was followed and re-evaluated by the ACP. All labs, imaging and procedures were evaluated and performed by the ACP and I was available for consultation if any questions in the patients care came up.   I personally made/approved the management plan and take responsibility for the patient management.

## 2024-12-27 NOTE — ED STATDOCS - PROGRESS NOTE DETAILS
29-year-old female history of migraines in the past presents to the emergency department for syncope.  Patient states that last night she woke up at 3:30 in the morning because she had some abdominal cramping was sitting on the toilet when she felt like her vision was closing in and she fainted and landed in the bathtub.  Patient got up and went back to sleep and then later in the day was still feeling crampy and nauseous with splashing water on her face and had another episode where she had a syncopal episode.  She went to urgent care and was told to come to the hospital for evaluation.  No chest pain no shortness of breath no leg swelling she had a very mild headache no neck pain.  Did not take anything for symptoms.  Now patient's main complaint is some acid reflux feeling cramping abdominal feeling and nausea.    Exam: Non-focal    Plan: Labs, fluids, urine  Renetta Horton, DNP Expressing relief of symptoms. Pt is well appearing walking with steady gait, stable for discharge and follow up without fail with medical doctor. I had a detailed discussion with the patient and/or guardian regarding the historical points, exam findings, and any diagnostic results supporting the discharge diagnosis. Pt educated on care and need for follow up. Strict return instructions and red flag signs and symptoms discussed with patient. Questions answered. Pt shows understanding of discharge information and agrees to follow.

## 2024-12-27 NOTE — ED STATDOCS - PHYSICAL EXAMINATION
Constitutional: NAD AAOx3, well-appearing and nontoxic  Eyes: PERRLA EOMI  Head: Normocephalic atraumatic  Mouth: MMM  Cardiac: regular rate   Resp: unlabored breathing  GI: Abd s/nt/nd  Neuro: grossly normal and intact ,  no signs of traumatic injury,  Skin: No visible rashes Constitutional: NAD AAOx3, well-appearing and nontoxic  Eyes: PERRLA EOMI  Head: Normocephalic atraumatic  Mouth: MMM  Cardiac: regular rate   Resp: unlabored breathing clear b/l   GI: Abd s/nt/nd  Neuro: grossly normal and intact , cn2-12 intact normal strength sensation coordination gait nih-0 no signs of traumatic injury,  Skin: No visible rashes

## 2024-12-27 NOTE — ED STATDOCS - CLINICAL SUMMARY MEDICAL DECISION MAKING FREE TEXT BOX
29-year-old female history of migraines in the past presents to the emergency department for syncope.  Patient states that last night she woke up at 3:30 in the morning because she had some abdominal cramping was sitting on the toilet when she felt like her vision was closing in and she fainted and landed in the bathtub.  Patient got up and went back to sleep and then later in the day was still feeling crampy and nauseous with splashing water on her face and had another episode where she had a syncopal episode.  She went to urgent care and was told to come to the hospital for evaluation.  No chest pain no shortness of breath no leg swelling she had a very mild headache no neck pain.  Did not take anything for symptoms.  Now patient's main complaint is some acid reflux feeling cramping abdominal feeling and nausea.  Exam here is nonfocal her neurological exam is normal she has no signs of traumatic injury she is well-appearing and nontoxic.  Patient likely with vasovagal will check labs EKG symptom control reassess no signs or concern for SAH ICH PE seizure

## 2024-12-27 NOTE — ED STATDOCS - NSFOLLOWUPINSTRUCTIONS_ED_ALL_ED_FT
Nausea and Vomiting, Adult  Nausea is feeling that you have an upset stomach and that you are about to vomit. Vomiting is when food in your stomach forcefully comes out of your mouth. Vomiting can make you feel weak. If you vomit, or if you are not able to drink enough fluids, you may not have enough water in your body (get dehydrated). If you do not have enough water in your body, you may:  Feel tired.  Feel thirsty.  Have a dry mouth.  Have cracked lips.  Pee (urinate) less often.  Older adults and people with other diseases or a weak body defense system (immune system) are at higher risk for not having enough water in the body. If you feel like you may vomit or you vomit, it is important to follow instructions from your doctor about how to take care of yourself.    Follow these instructions at home:  Watch your symptoms for any changes. Tell your doctor about them.    Eating and drinking    A bottle of clear fruit juice and glass of water.  A sign showing that a person should not drink alcohol.  Take an ORS (oral rehydration solution). This is a drink that is sold at pharmacies and stores.  Drink clear fluids in small amounts as you are able, such as:  Water.  Ice chips.  Fruit juice that has water added (diluted fruit juice).  Low-calorie sports drinks.  Eat bland, easy-to-digest foods in small amounts as you are able, such as:  Bananas.  Applesauce.  Rice.  Low-fat (lean) meats.  Toast.  Crackers.  Avoid drinking fluids that have a lot of sugar or caffeine in them. This includes energy drinks, sports drinks, and soda.  Avoid alcohol.  Avoid spicy or fatty foods.  General instructions    Take over-the-counter and prescription medicines only as told by your doctor.  Drink enough fluid to keep your pee (urine) pale yellow.  Wash your hands often with soap and water for at least 20 seconds. If you cannot use soap and water, use hand .  Make sure that everyone in your home washes their hands well and often.  Rest at home until you feel better.  Watch your condition for any changes.  Take slow and deep breaths when you feel like you may vomit.  Keep all follow-up visits.  Contact a doctor if:  Your symptoms get worse.  You have new symptoms.  You have a fever.  You cannot drink fluids without vomiting.  You feel like you may vomit for more than 2 days.  You feel light-headed or dizzy.  You have a headache.  You have muscle cramps.  You have a rash.  You have pain while peeing.  Get help right away if:  You have pain in your chest, neck, arm, or jaw.  You feel very weak or you faint.  You vomit again and again.  You have vomit that is bright red or looks like black coffee grounds.  You have bloody or black poop (stools) or poop that looks like tar.  You have a very bad headache, a stiff neck, or both.  You have very bad pain, cramping, or bloating in your belly (abdomen).  You have trouble breathing.  You are breathing very quickly.  Your heart is beating very quickly.  Your skin feels cold and clammy.  You feel confused.  You have signs of losing too much water in your body, such as:  Dark pee, very little pee, or no pee.  Cracked lips.  Dry mouth.  Sunken eyes.  Sleepiness.  Weakness.  These symptoms may be an emergency. Get help right away. Call 911.  Do not wait to see if the symptoms will go away.  Do not drive yourself to the hospital.  Summary  Nausea is feeling that you have an upset stomach and that you are about to vomit. Vomiting is when food in your stomach comes out of your mouth.  Follow instructions from your doctor about eating and drinking.  Take over-the-counter and prescription medicines only as told by your doctor.  Contact your doctor if your symptoms get worse or you have new symptoms.  Keep all follow-up visits.  This information is not intended to replace advice given to you by your health care provider. Make sure you discuss any questions you have with your health care provider.

## 2024-12-27 NOTE — ED ADULT TRIAGE NOTE - CHIEF COMPLAINT QUOTE
PT C/O BILATERAL FOOT PAIN . SEEN HERE 2 WEEKS AGO REFERRED TO [facility] CANNOT GET AN APPOINTMENT. . PMH: DIABETES II, NEUROPATHY. pt c/o vomiting and syncope x 2 this am. no significant pmh. ekg in triage.

## 2024-12-27 NOTE — ED ADULT NURSE NOTE - NS ED NURSE DISCH DISPOSITION
Good to see you today!     For your upcoming surgery, I'll need to give you instructions on timing of stopping the Eliquis. Once you have a surgery date, please call my office!    Otherwise let's plan to follow up in 4 months. If you'd like to make a virtual appointment you can do that.    Should you have questions, please do not hesitate to call my office at [phone].    
Discharged

## 2024-12-27 NOTE — ED ADULT NURSE NOTE - OBJECTIVE STATEMENT
Pt is 28 yo female c/o syncope. Pt reports two episodes of syncopal episodes. Denies head strikes and PMHx. Pt reports lightheadedness and nausea.

## 2025-01-28 ENCOUNTER — APPOINTMENT (OUTPATIENT)
Dept: DERMATOLOGY | Facility: CLINIC | Age: 30
End: 2025-01-28

## 2025-04-09 ENCOUNTER — APPOINTMENT (OUTPATIENT)
Dept: INTERNAL MEDICINE | Facility: CLINIC | Age: 30
End: 2025-04-09
Payer: COMMERCIAL

## 2025-04-09 VITALS
WEIGHT: 166 LBS | SYSTOLIC BLOOD PRESSURE: 119 MMHG | HEIGHT: 64 IN | RESPIRATION RATE: 16 BRPM | OXYGEN SATURATION: 97 % | DIASTOLIC BLOOD PRESSURE: 84 MMHG | HEART RATE: 73 BPM | BODY MASS INDEX: 28.34 KG/M2

## 2025-04-09 DIAGNOSIS — Z00.00 ENCOUNTER FOR GENERAL ADULT MEDICAL EXAMINATION W/OUT ABNORMAL FINDINGS: ICD-10-CM

## 2025-04-09 PROCEDURE — 36415 COLL VENOUS BLD VENIPUNCTURE: CPT

## 2025-04-09 PROCEDURE — 99395 PREV VISIT EST AGE 18-39: CPT

## 2025-04-09 PROCEDURE — 93000 ELECTROCARDIOGRAM COMPLETE: CPT | Mod: 59

## 2025-04-09 PROCEDURE — G0444 DEPRESSION SCREEN ANNUAL: CPT | Mod: 59

## 2025-04-10 LAB
25(OH)D3 SERPL-MCNC: 35.5 NG/ML
ALBUMIN SERPL ELPH-MCNC: 4.6 G/DL
ALP BLD-CCNC: 76 U/L
ALT SERPL-CCNC: 11 U/L
ANION GAP SERPL CALC-SCNC: 13 MMOL/L
APPEARANCE: CLEAR
AST SERPL-CCNC: 13 U/L
BACTERIA: ABNORMAL /HPF
BASOPHILS # BLD AUTO: 0.04 K/UL
BASOPHILS NFR BLD AUTO: 0.9 %
BILIRUB SERPL-MCNC: 0.3 MG/DL
BILIRUBIN URINE: NEGATIVE
BLOOD URINE: ABNORMAL
BUN SERPL-MCNC: 12 MG/DL
CALCIUM SERPL-MCNC: 9.6 MG/DL
CAST: 0 /LPF
CHLORIDE SERPL-SCNC: 103 MMOL/L
CHOLEST SERPL-MCNC: 171 MG/DL
CO2 SERPL-SCNC: 26 MMOL/L
COLOR: YELLOW
CREAT SERPL-MCNC: 0.72 MG/DL
EGFRCR SERPLBLD CKD-EPI 2021: 116 ML/MIN/1.73M2
EOSINOPHIL # BLD AUTO: 0.33 K/UL
EOSINOPHIL NFR BLD AUTO: 7.7 %
EPITHELIAL CELLS: 4 /HPF
GLUCOSE QUALITATIVE U: NEGATIVE MG/DL
GLUCOSE SERPL-MCNC: 94 MG/DL
HCT VFR BLD CALC: 45 %
HDLC SERPL-MCNC: 60 MG/DL
HGB BLD-MCNC: 14.4 G/DL
IMM GRANULOCYTES NFR BLD AUTO: 0.2 %
KETONES URINE: NEGATIVE MG/DL
LDLC SERPL-MCNC: 99 MG/DL
LEUKOCYTE ESTERASE URINE: NEGATIVE
LYMPHOCYTES # BLD AUTO: 1.9 K/UL
LYMPHOCYTES NFR BLD AUTO: 44.5 %
MAN DIFF?: NORMAL
MCHC RBC-ENTMCNC: 28.7 PG
MCHC RBC-ENTMCNC: 32 G/DL
MCV RBC AUTO: 89.6 FL
MICROSCOPIC-UA: NORMAL
MONOCYTES # BLD AUTO: 0.46 K/UL
MONOCYTES NFR BLD AUTO: 10.8 %
NEUTROPHILS # BLD AUTO: 1.53 K/UL
NEUTROPHILS NFR BLD AUTO: 35.9 %
NITRITE URINE: NEGATIVE
NONHDLC SERPL-MCNC: 112 MG/DL
PH URINE: 5.5
PLATELET # BLD AUTO: 194 K/UL
POTASSIUM SERPL-SCNC: 4.5 MMOL/L
PROT SERPL-MCNC: 7.8 G/DL
PROTEIN URINE: NORMAL MG/DL
RBC # BLD: 5.02 M/UL
RBC # FLD: 13.4 %
RED BLOOD CELLS URINE: 3 /HPF
SODIUM SERPL-SCNC: 142 MMOL/L
SPECIFIC GRAVITY URINE: 1.03
TRIGL SERPL-MCNC: 67 MG/DL
UROBILINOGEN URINE: 0.2 MG/DL
WBC # FLD AUTO: 4.27 K/UL
WHITE BLOOD CELLS URINE: 1 /HPF

## 2025-07-02 ENCOUNTER — APPOINTMENT (OUTPATIENT)
Dept: RHEUMATOLOGY | Facility: CLINIC | Age: 30
End: 2025-07-02
Payer: COMMERCIAL

## 2025-07-02 PROBLEM — M25.541 ARTHRALGIA OF BOTH HANDS: Status: ACTIVE | Noted: 2025-07-02

## 2025-07-02 PROCEDURE — 99204 OFFICE O/P NEW MOD 45 MIN: CPT

## 2025-07-03 LAB
CCP AB SER IA-ACNC: <8 U/ML
CCP AB SER QL: NEGATIVE
CONFIRM: 32.5 SEC
CRP SERPL-MCNC: <3 MG/L
DRVVT IMM 1:2 NP PPP: NORMAL
DRVVT SCREEN TO CONFIRM RATIO: 0.92 RATIO
ERYTHROCYTE [SEDIMENTATION RATE] IN BLOOD BY WESTERGREN METHOD: 9 MM/HR
FOLATE SERPL-MCNC: 8.7 NG/ML
RHEUMATOID FACT SERPL-ACNC: <10 IU/ML
SCREEN DRVVT: 38.5 SEC
SILICA CLOTTING TIME INTERPRETATION: NORMAL
SILICA CLOTTING TIME S/C: 1 RATIO
THYROGLOB AB SERPL-ACNC: 17.6 IU/ML
THYROPEROXIDASE AB SERPL IA-ACNC: 12.1 IU/ML
TSH SERPL-ACNC: 1.6 UIU/ML
VIT B12 SERPL-MCNC: 419 PG/ML

## 2025-07-07 LAB
ALBUMIN MFR SERPL ELPH: 57 %
ALBUMIN SERPL-MCNC: 4.6 G/DL
ALBUMIN/GLOB SERPL: 1.3 RATIO
ALPHA1 GLOB MFR SERPL ELPH: 3.6 %
ALPHA1 GLOB SERPL ELPH-MCNC: 0.3 G/DL
ALPHA2 GLOB MFR SERPL ELPH: 9.9 %
ALPHA2 GLOB SERPL ELPH-MCNC: 0.8 G/DL
ANA TITR SER: NEGATIVE
B-GLOBULIN MFR SERPL ELPH: 10.1 %
B-GLOBULIN SERPL ELPH-MCNC: 0.8 G/DL
B2 GLYCOPROT1 IGA SERPL IA-ACNC: <2 U/ML
B2 GLYCOPROT1 IGG SERPL IA-ACNC: <1.4 U/ML
B2 GLYCOPROT1 IGM SERPL IA-ACNC: 2.2 U/ML
C3 SERPL-MCNC: 118 MG/DL
C4 SERPL-MCNC: 26 MG/DL
CARDIOLIPIN IGA SER IA-ACNC: <2 APL U/ML
CARDIOLIPIN IGG SER IA-ACNC: <1.6 GPL U/ML
CARDIOLIPIN IGM SER IA-ACNC: 1.6 MPL U/ML
CENTROMERE B AB SER-ACNC: <0.2 AL
DSDNA AB SER-ACNC: 1 IU/ML
ENA RNP AB SER-ACNC: <0.2 AL
ENA SCL70 AB SER-ACNC: <0.2 AL
ENA SM AB SER-ACNC: <0.2 AL
ENA SS-A AB SER-ACNC: <0.2 AL
ENA SS-B AB SER-ACNC: <0.2 AL
G6PD SER-CCNC: 15.7 U/G HGB
GAMMA GLOB FLD ELPH-MCNC: 1.6 G/DL
GAMMA GLOB MFR SERPL ELPH: 19.4 %
INTERPRETATION SERPL IEP-IMP: NORMAL
M PROTEIN SPEC IFE-MCNC: NORMAL
PROT SERPL-MCNC: 8.1 G/DL
PROT SERPL-MCNC: 8.1 G/DL

## 2025-07-09 RX ORDER — AMLODIPINE BESYLATE 2.5 MG/1
2.5 TABLET ORAL DAILY
Qty: 30 | Refills: 2 | Status: ACTIVE | COMMUNITY
Start: 2025-07-09 | End: 1900-01-01

## 2025-08-12 ENCOUNTER — NON-APPOINTMENT (OUTPATIENT)
Age: 30
End: 2025-08-12

## 2025-08-14 ENCOUNTER — APPOINTMENT (OUTPATIENT)
Dept: OBGYN | Facility: CLINIC | Age: 30
End: 2025-08-14
Payer: COMMERCIAL

## 2025-08-14 VITALS
SYSTOLIC BLOOD PRESSURE: 110 MMHG | DIASTOLIC BLOOD PRESSURE: 70 MMHG | HEIGHT: 64 IN | BODY MASS INDEX: 27.49 KG/M2 | WEIGHT: 161 LBS

## 2025-08-14 DIAGNOSIS — Z01.419 ENCOUNTER FOR GYNECOLOGICAL EXAMINATION (GENERAL) (ROUTINE) W/OUT ABNORMAL FINDINGS: ICD-10-CM

## 2025-08-14 PROCEDURE — 99395 PREV VISIT EST AGE 18-39: CPT

## 2025-08-18 LAB
C TRACH RRNA SPEC QL NAA+PROBE: NOT DETECTED
HPV HIGH+LOW RISK DNA PNL CVX: NOT DETECTED
N GONORRHOEA RRNA SPEC QL NAA+PROBE: NOT DETECTED
SOURCE TP AMPLIFICATION: NORMAL

## 2025-08-20 LAB — CYTOLOGY CVX/VAG DOC THIN PREP: ABNORMAL
